# Patient Record
Sex: MALE | Employment: OTHER | URBAN - METROPOLITAN AREA
[De-identification: names, ages, dates, MRNs, and addresses within clinical notes are randomized per-mention and may not be internally consistent; named-entity substitution may affect disease eponyms.]

---

## 2017-05-13 ENCOUNTER — APPOINTMENT (OUTPATIENT)
Dept: GENERAL RADIOLOGY | Age: 72
DRG: 247 | End: 2017-05-13
Attending: EMERGENCY MEDICINE
Payer: MEDICARE

## 2017-05-13 ENCOUNTER — HOSPITAL ENCOUNTER (INPATIENT)
Age: 72
LOS: 7 days | Discharge: HOME | DRG: 247 | End: 2017-05-20
Attending: EMERGENCY MEDICINE | Admitting: HOSPITALIST
Payer: MEDICARE

## 2017-05-13 DIAGNOSIS — I21.4 NSTEMI (NON-ST ELEVATED MYOCARDIAL INFARCTION) (HCC): Primary | ICD-10-CM

## 2017-05-13 LAB
ALBUMIN SERPL BCP-MCNC: 3.8 G/DL (ref 3.4–5)
ALBUMIN/GLOB SERPL: 0.8 {RATIO} (ref 0.8–1.7)
ALP SERPL-CCNC: 91 U/L (ref 45–117)
ALT SERPL-CCNC: 30 U/L (ref 16–61)
ANION GAP BLD CALC-SCNC: 14 MMOL/L (ref 3–18)
APTT PPP: 25.7 SEC (ref 23–36.4)
AST SERPL W P-5'-P-CCNC: 21 U/L (ref 15–37)
BASOPHILS # BLD AUTO: 0 K/UL (ref 0–0.06)
BASOPHILS # BLD: 0 % (ref 0–2)
BILIRUB SERPL-MCNC: 0.1 MG/DL (ref 0.2–1)
BUN SERPL-MCNC: 112 MG/DL (ref 7–18)
BUN/CREAT SERPL: 32 (ref 12–20)
CALCIUM SERPL-MCNC: 9.1 MG/DL (ref 8.5–10.1)
CHLORIDE SERPL-SCNC: 99 MMOL/L (ref 100–108)
CK MB CFR SERPL CALC: 2.1 % (ref 0–4)
CK MB SERPL-MCNC: 3.9 NG/ML (ref 5–25)
CK SERPL-CCNC: 190 U/L (ref 39–308)
CO2 SERPL-SCNC: 26 MMOL/L (ref 21–32)
CREAT SERPL-MCNC: 3.48 MG/DL (ref 0.6–1.3)
D DIMER PPP FEU-MCNC: 1.36 UG/ML(FEU)
DIFFERENTIAL METHOD BLD: ABNORMAL
EOSINOPHIL # BLD: 0.1 K/UL (ref 0–0.4)
EOSINOPHIL NFR BLD: 1 % (ref 0–5)
ERYTHROCYTE [DISTWIDTH] IN BLOOD BY AUTOMATED COUNT: 16 % (ref 11.6–14.5)
GLOBULIN SER CALC-MCNC: 4.5 G/DL (ref 2–4)
GLUCOSE SERPL-MCNC: 254 MG/DL (ref 74–99)
HCT VFR BLD AUTO: 32.5 % (ref 36–48)
HGB BLD-MCNC: 10.4 G/DL (ref 13–16)
LIPASE SERPL-CCNC: 587 U/L (ref 73–393)
LYMPHOCYTES # BLD AUTO: 9 % (ref 21–52)
LYMPHOCYTES # BLD: 1.3 K/UL (ref 0.9–3.6)
MCH RBC QN AUTO: 25.9 PG (ref 24–34)
MCHC RBC AUTO-ENTMCNC: 32 G/DL (ref 31–37)
MCV RBC AUTO: 80.8 FL (ref 74–97)
MONOCYTES # BLD: 0.6 K/UL (ref 0.05–1.2)
MONOCYTES NFR BLD AUTO: 4 % (ref 3–10)
NEUTS SEG # BLD: 12.5 K/UL (ref 1.8–8)
NEUTS SEG NFR BLD AUTO: 86 % (ref 40–73)
PLATELET # BLD AUTO: 248 K/UL (ref 135–420)
PMV BLD AUTO: 11 FL (ref 9.2–11.8)
POTASSIUM SERPL-SCNC: 3.3 MMOL/L (ref 3.5–5.5)
PROT SERPL-MCNC: 8.3 G/DL (ref 6.4–8.2)
RBC # BLD AUTO: 4.02 M/UL (ref 4.7–5.5)
SODIUM SERPL-SCNC: 139 MMOL/L (ref 136–145)
TROPONIN I SERPL-MCNC: 0.09 NG/ML (ref 0–0.06)
WBC # BLD AUTO: 14.6 K/UL (ref 4.6–13.2)

## 2017-05-13 PROCEDURE — 85025 COMPLETE CBC W/AUTO DIFF WBC: CPT | Performed by: EMERGENCY MEDICINE

## 2017-05-13 PROCEDURE — 74011250637 HC RX REV CODE- 250/637: Performed by: EMERGENCY MEDICINE

## 2017-05-13 PROCEDURE — 85379 FIBRIN DEGRADATION QUANT: CPT | Performed by: EMERGENCY MEDICINE

## 2017-05-13 PROCEDURE — 65270000029 HC RM PRIVATE

## 2017-05-13 PROCEDURE — 99285 EMERGENCY DEPT VISIT HI MDM: CPT

## 2017-05-13 PROCEDURE — 71010 XR CHEST PORT: CPT

## 2017-05-13 PROCEDURE — 82550 ASSAY OF CK (CPK): CPT | Performed by: EMERGENCY MEDICINE

## 2017-05-13 PROCEDURE — 85730 THROMBOPLASTIN TIME PARTIAL: CPT | Performed by: EMERGENCY MEDICINE

## 2017-05-13 PROCEDURE — 96374 THER/PROPH/DIAG INJ IV PUSH: CPT

## 2017-05-13 PROCEDURE — 94762 N-INVAS EAR/PLS OXIMTRY CONT: CPT

## 2017-05-13 PROCEDURE — 74011250636 HC RX REV CODE- 250/636: Performed by: EMERGENCY MEDICINE

## 2017-05-13 PROCEDURE — 80053 COMPREHEN METABOLIC PANEL: CPT | Performed by: EMERGENCY MEDICINE

## 2017-05-13 PROCEDURE — 83690 ASSAY OF LIPASE: CPT | Performed by: EMERGENCY MEDICINE

## 2017-05-13 PROCEDURE — 93005 ELECTROCARDIOGRAM TRACING: CPT

## 2017-05-13 RX ORDER — BUMETANIDE 1 MG/1
1 TABLET ORAL 2 TIMES DAILY
COMMUNITY

## 2017-05-13 RX ORDER — HEPARIN SODIUM 10000 [USP'U]/100ML
8.16-25 INJECTION, SOLUTION INTRAVENOUS
Status: DISCONTINUED | OUTPATIENT
Start: 2017-05-13 | End: 2017-05-18

## 2017-05-13 RX ORDER — AMLODIPINE BESYLATE 5 MG/1
5 TABLET ORAL DAILY
Status: ON HOLD | COMMUNITY
End: 2017-05-20

## 2017-05-13 RX ORDER — DUTASTERIDE 0.5 MG/1
0.5 CAPSULE, LIQUID FILLED ORAL DAILY
COMMUNITY

## 2017-05-13 RX ORDER — HEPARIN SODIUM 1000 [USP'U]/ML
32.7 INJECTION, SOLUTION INTRAVENOUS; SUBCUTANEOUS ONCE
Status: COMPLETED | OUTPATIENT
Start: 2017-05-13 | End: 2017-05-13

## 2017-05-13 RX ORDER — PREDNISONE 5 MG/1
5 TABLET ORAL DAILY
COMMUNITY

## 2017-05-13 RX ORDER — GUAIFENESIN 100 MG/5ML
81 LIQUID (ML) ORAL DAILY
COMMUNITY

## 2017-05-13 RX ORDER — POTASSIUM CHLORIDE 750 MG/1
10 TABLET, FILM COATED, EXTENDED RELEASE ORAL 2 TIMES DAILY
COMMUNITY
End: 2017-05-20

## 2017-05-13 RX ORDER — ISOSORBIDE MONONITRATE 30 MG/1
30 TABLET, EXTENDED RELEASE ORAL DAILY
COMMUNITY

## 2017-05-13 RX ORDER — GLIMEPIRIDE 2 MG/1
2 TABLET ORAL
COMMUNITY

## 2017-05-13 RX ORDER — CHOLECALCIFEROL TAB 125 MCG (5000 UNIT) 125 MCG
5000 TAB ORAL DAILY
COMMUNITY

## 2017-05-13 RX ORDER — HYDRALAZINE HYDROCHLORIDE 50 MG/1
50 TABLET, FILM COATED ORAL 2 TIMES DAILY
COMMUNITY

## 2017-05-13 RX ORDER — LOSARTAN POTASSIUM 50 MG/1
50 TABLET ORAL DAILY
COMMUNITY

## 2017-05-13 RX ORDER — LEVOTHYROXINE SODIUM 100 UG/1
100 TABLET ORAL
COMMUNITY

## 2017-05-13 RX ORDER — ATORVASTATIN CALCIUM 40 MG/1
40 TABLET, FILM COATED ORAL DAILY
COMMUNITY
End: 2017-05-20

## 2017-05-13 RX ORDER — GUAIFENESIN 100 MG/5ML
243 LIQUID (ML) ORAL
Status: COMPLETED | OUTPATIENT
Start: 2017-05-13 | End: 2017-05-13

## 2017-05-13 RX ADMIN — NITROGLYCERIN 0.5 INCH: 20 OINTMENT TOPICAL at 20:32

## 2017-05-13 RX ADMIN — ASPIRIN 81 MG 243 MG: 81 TABLET ORAL at 19:44

## 2017-05-13 RX ADMIN — HEPARIN SODIUM AND DEXTROSE 8.16 UNITS/KG/HR: 10000; 5 INJECTION INTRAVENOUS at 21:39

## 2017-05-13 RX ADMIN — HEPARIN SODIUM 4000 UNITS: 1000 INJECTION, SOLUTION INTRAVENOUS; SUBCUTANEOUS at 21:38

## 2017-05-13 NOTE — ED PROVIDER NOTES
HPI Comments: 7:30 PM Keyla Benavides is a 66 y.o. male who presents to the ED c/o chest tightness that started ~90 minutes ago. The pt reports that he was at a birthday party when he became nauseated. The nausea resolved and he thought it was due to what he was eating at the party. About 90 minutes ago, he was walking into [facility] when he became nauseated again and had one episode of vomiting. He went to sit in his truck and he developed chest tightness, so his wife brought him to the ED. He has a hx of several stents placed in 2008. He took an 81 mg ASA this morning. He does states that they recently drove here from [facility]. He is currently asymptomatic. He denies leg pain, leg swelling, abdominal pain, diarrhea, fever, cough, and any further complaints. The history is provided by the patient. Past Medical History:   Diagnosis Date    Glaucoma     Heart disease     Hypertension     MI (myocardial infarction) ([facility] [address]     Pituitary tumor        Past Surgical History:   Procedure Laterality Date    HX CORONARY STENT PLACEMENT           History reviewed. No pertinent family history. Social History     Social History    Marital status:      Spouse name: N/A    Number of children: N/A    Years of education: N/A     Occupational History    Not on file. Social History Main Topics    Smoking status: Former Smoker    Smokeless tobacco: Not on file    Alcohol use No    Drug use: Not on file    Sexual activity: Not on file     Other Topics Concern    Not on file     Social History Narrative    No narrative on file         ALLERGIES: Review of patient's allergies indicates no known allergies. Review of Systems   Constitutional: Negative. Negative for fever. HENT: Negative. Eyes: Negative. Respiratory: Positive for chest tightness. Negative for cough and shortness of breath. Cardiovascular: Positive for chest pain. Negative for leg swelling.    Gastrointestinal: Positive for nausea and vomiting. Negative for abdominal pain and diarrhea. Endocrine: Negative. Genitourinary: Negative. Musculoskeletal: Negative. Skin: Negative. Allergic/Immunologic: Negative. Neurological: Negative. Hematological: Negative. Psychiatric/Behavioral: Negative. All other systems reviewed and are negative. Vitals:    05/13/17 1930 05/13/17 2007 05/13/17 2010   BP: 182/77 172/90 183/75   Pulse: (!) 110 (!) 104 (!) 104   Resp: 22 16 15   Temp: 98 °F (36.7 °C)     SpO2: 94% 96% 96%   Weight: 121 kg (266 lb 11.2 oz)     Height: 5' 7\" (1.702 m)              Physical Exam   Constitutional: He is oriented to person, place, and time. He appears well-developed and well-nourished. No distress. HENT:   Head: Normocephalic. Mouth/Throat: Oropharynx is clear and moist.   Eyes: Conjunctivae and EOM are normal. Pupils are equal, round, and reactive to light. Neck: Normal range of motion. Neck supple. Cardiovascular: Normal rate, regular rhythm, normal heart sounds and intact distal pulses. No murmur heard. Pulmonary/Chest: Effort normal and breath sounds normal. No respiratory distress. He has no wheezes. He has no rales. He exhibits no tenderness. Abdominal: Soft. Bowel sounds are normal. He exhibits no distension. There is no tenderness. There is no rebound. Genitourinary: Rectal exam shows guaiac positive stool. Genitourinary Comments: Brown stool   Musculoskeletal: Normal range of motion. He exhibits no edema or tenderness. Neurological: He is alert and oriented to person, place, and time. No cranial nerve deficit. He exhibits normal muscle tone. Coordination normal.   Skin: Skin is warm and dry. No rash noted. Psychiatric: He has a normal mood and affect. His behavior is normal. Judgment and thought content normal.   Nursing note and vitals reviewed.        MDM  Number of Diagnoses or Management Options     Amount and/or Complexity of Data Reviewed  Clinical lab tests: ordered and reviewed  Tests in the radiology section of CPT®: ordered and reviewed    Risk of Complications, Morbidity, and/or Mortality  Presenting problems: high  Diagnostic procedures: high  Management options: high    Patient Progress  Patient progress: stable    ED Course       Procedures    Vitals:  Patient Vitals for the past 12 hrs:   Temp Pulse Resp BP SpO2   05/13/17 2010 - (!) 104 15 183/75 96 %   05/13/17 2007 - (!) 104 16 172/90 96 %   05/13/17 1930 98 °F (36.7 °C) (!) 110 22 182/77 94 %     Pulse ox reviewed    Medications ordered:   Medications   nitroglycerin (NITROBID) 2 % ointment 0.5 Inch (0.5 Inches Topical Given 5/13/17 2032)   heparin (porcine) 1,000 unit/mL injection 4,010 Units (not administered)   heparin 25,000 units in D5W 250 ml infusion (not administered)   aspirin chewable tablet 243 mg (243 mg Oral Given 5/13/17 1944)         Lab findings:  Recent Results (from the past 12 hour(s))   EKG, 12 LEAD, INITIAL    Collection Time: 05/13/17  7:19 PM   Result Value Ref Range    Ventricular Rate 110 BPM    Atrial Rate 110 BPM    P-R Interval 182 ms    QRS Duration 104 ms    Q-T Interval 346 ms    QTC Calculation (Bezet) 468 ms    Calculated P Axis 100 degrees    Calculated R Axis 18 degrees    Calculated T Axis 114 degrees    Diagnosis       Sinus tachycardia  Cannot rule out Inferior infarct , age undetermined  T wave abnormality, consider lateral ischemia  Abnormal ECG  No previous ECGs available     CARDIAC PANEL,(CK, CKMB & TROPONIN)    Collection Time: 05/13/17  7:35 PM   Result Value Ref Range     39 - 308 U/L    CK - MB 3.9 (H) <3.6 ng/ml    CK-MB Index 2.1 0.0 - 4.0 %    Troponin-I, Qt. 0.09 (H) 0.00 - 8.00 NG/ML   METABOLIC PANEL, COMPREHENSIVE    Collection Time: 05/13/17  7:35 PM   Result Value Ref Range    Sodium 139 136 - 145 mmol/L    Potassium 3.3 (L) 3.5 - 5.5 mmol/L    Chloride 99 (L) 100 - 108 mmol/L    CO2 26 21 - 32 mmol/L    Anion gap 14 3.0 - 18 mmol/L Glucose 254 (H) 74 - 99 mg/dL     (H) 7.0 - 18 MG/DL    Creatinine 3.48 (H) 0.6 - 1.3 MG/DL    BUN/Creatinine ratio 32 (H) 12 - 20      GFR est AA 21 (L) >60 ml/min/1.73m2    GFR est non-AA 17 (L) >60 ml/min/1.73m2    Calcium 9.1 8.5 - 10.1 MG/DL    Bilirubin, total 0.1 (L) 0.2 - 1.0 MG/DL    ALT (SGPT) 30 16 - 61 U/L    AST (SGOT) 21 15 - 37 U/L    Alk. phosphatase 91 45 - 117 U/L    Protein, total 8.3 (H) 6.4 - 8.2 g/dL    Albumin 3.8 3.4 - 5.0 g/dL    Globulin 4.5 (H) 2.0 - 4.0 g/dL    A-G Ratio 0.8 0.8 - 1.7     LIPASE    Collection Time: 05/13/17  7:35 PM   Result Value Ref Range    Lipase 587 (H) 73 - 393 U/L   CBC WITH AUTOMATED DIFF    Collection Time: 05/13/17  7:35 PM   Result Value Ref Range    WBC 14.6 (H) 4.6 - 13.2 K/uL    RBC 4.02 (L) 4.70 - 5.50 M/uL    HGB 10.4 (L) 13.0 - 16.0 g/dL    HCT 32.5 (L) 36.0 - 48.0 %    MCV 80.8 74.0 - 97.0 FL    MCH 25.9 24.0 - 34.0 PG    MCHC 32.0 31.0 - 37.0 g/dL    RDW 16.0 (H) 11.6 - 14.5 %    PLATELET 834 950 - 247 K/uL    MPV 11.0 9.2 - 11.8 FL    NEUTROPHILS 86 (H) 40 - 73 %    LYMPHOCYTES 9 (L) 21 - 52 %    MONOCYTES 4 3 - 10 %    EOSINOPHILS 1 0 - 5 %    BASOPHILS 0 0 - 2 %    ABS. NEUTROPHILS 12.5 (H) 1.8 - 8.0 K/UL    ABS. LYMPHOCYTES 1.3 0.9 - 3.6 K/UL    ABS. MONOCYTES 0.6 0.05 - 1.2 K/UL    ABS. EOSINOPHILS 0.1 0.0 - 0.4 K/UL    ABS. BASOPHILS 0.0 0.0 - 0.06 K/UL    DF AUTOMATED     D DIMER    Collection Time: 05/13/17  7:35 PM   Result Value Ref Range    D DIMER 1.36 (H) <0.46 ug/ml(FEU)       EKG interpretation by ED Physician:  3597- Sinus tachycardia, rate of 110 BPM, lateral ischemia, no STEMI per Dr. Loly Blevins, CT or other radiology findings or impressions:  XR CHEST PORT   Final Result   Impression:  1. No acute infiltrate or effusion.     2. Cardiomegaly    Per Dr. Omar Glaser, Radiology       Progress notes, Consult notes or additional Procedure notes:   8:38 PM I have reassessed the patient and discussed results and diagnosis.  Pt will be admitted. Patient understands and verbalizes agreement with plan. Consult:  Discussed care with Frank Goodson, Cardiology PA  Standard discussion; including history of patients chief complaint, available diagnostic results, and treatment course. He agrees on consult to Dr. Nestor Chu at [facility]. He recommends starting Heparin. 8:59 PM, 05/13/17     Consult:  Discussed care with Dr. Navi Almonte, Specialty: Hospitalist  Standard discussion; including history of patients chief complaint, available diagnostic results, and treatment course. He agrees on admission to [facility]. 9:08 PM, 05/13/17     9:26 PM Patient reassessed, performed rectal exam prior to starting Heparin. Patient had heme positive brown stool. Will discuss with hospitalist.     9:32 PM Discussed with Dr. Navi Almonte, he states to continue Heparin. Disposition:  Diagnosis: acute elevated troponin, elevated D-dimer, R/O acute coronary syndrome, non STEMI, R/O DVT    Disposition:admission      SCRIBE ATTESTATION STATEMENT  Documented by: Gennaro Wadsworth scribing for, and in the presence of, Ada Mi MD 7:41 PM     Signed by: Ping Hernandez, 05/13/17 7:41 PM    PROVIDER ATTESTATION STATEMENT  I personally performed the services described in the documentation, reviewed the documentation, as recorded by the scribe in my presence, and it accurately and completely records my words and actions.   Ada Mi MD

## 2017-05-13 NOTE — IP AVS SNAPSHOT
[address] [place] [id number] 
[phone] Patient: Lashonda Alvarez. MRN: [id number] :1945 You are allergic to the following No active allergies Recent Documentation Height Weight BMI Smoking Status 1.702 m 129.6 kg 44.75 kg/m2 Former Smoker Emergency Contacts Name Discharge Info Relation Home Work Mobile Lamar Gibbs  Spouse [3] [phone] About your hospitalization You were admitted on:  May 13, 2017 You last received care in the:  [facility] 2 CV INTNS[facility] CARE You were discharged on:  May 20, 2017 Unit phone number:  [phone] Why you were hospitalized Your primary diagnosis was:  Heme Positive Stool Your diagnoses also included:  Nstemi (Non-St Elevated Myocardial Infarction) (Hcc), Chronic Kidney Disease, Stage Iii (Moderate), Anemia, Hypertension Providers Seen During Your Hospitalizations Provider Role Specialty Primary office phone Yolanda Menendez MD Attending Provider Emergency Medicine [phone] Logan Nye MD Attending Provider Internal Medicine [phone] Your Primary Care Physician (PCP) Primary Care Physician Office Phone Office Fax UNKNOWN, PROVIDER ** None ** ** None ** Follow-up Information Follow up With Details Comments Contact Info Dr. Rohan Mathews  Patient states he has a follow-up appointment with PCP on 17 Internal Medicine [address] [facility] [address] [place] [address] Phone: [phone] Dr. Toby Jeter   Patient states he has a follow-up appointment with cardiologist on 17 Cardiologist: 
[address] [place] [address] # [address], [place], [address] [facility] Phone: [phone] Dr. Keerthi Artis  Please make appointment asap with kidney doctor Nephrology: 
[id number], [place], [address] [facility] Phone: [phone] Current Discharge Medication List  
 START taking these medications Dose & Instructions Dispensing Information Comments Morning Noon Evening Bedtime  
 clopidogrel 75 mg Tab Commonly known as:  PLAVIX Your last dose was: Your next dose is:    
   
   
 Dose:  75 mg Take 1 Tab by mouth daily. Quantity:  30 Tab Refills:  0  
     
   
   
   
  
 metoprolol tartrate 25 mg tablet Commonly known as:  LOPRESSOR Your last dose was: Your next dose is:    
   
   
 Dose:  12.5 mg Take 0.5 Tabs by mouth two (2) times a day. Quantity:  60 Tab Refills:  0  
     
   
   
   
  
 rosuvastatin 40 mg tablet Commonly known as:  CRESTOR Your last dose was: Your next dose is:    
   
   
 Dose:  40 mg Take 1 Tab by mouth nightly. Quantity:  30 Tab Refills:  0 CONTINUE these medications which have CHANGED Dose & Instructions Dispensing Information Comments Morning Noon Evening Bedtime  
 amLODIPine 5 mg tablet Commonly known as:  Kristen Fast What changed:  how much to take Your last dose was: Your next dose is:    
   
   
 Dose:  10 mg Take 2 Tabs by mouth daily. Quantity:  20 Tab Refills:  0 Increased dose CONTINUE these medications which have NOT CHANGED Dose & Instructions Dispensing Information Comments Morning Noon Evening Bedtime ALLOPURINOL PO Your last dose was: Your next dose is:    
   
   
 Dose:  150 mg Take 150 mg by mouth daily. Refills:  0  
     
   
   
   
  
 aspirin 81 mg chewable tablet Your last dose was: Your next dose is:    
   
   
 Dose:  81 mg Take 81 mg by mouth daily. Refills:  0  
     
   
   
   
  
 AVODART 0.5 mg capsule Generic drug:  dutasteride Your last dose was: Your next dose is:    
   
   
 Dose:  0.5 mg Take 0.5 mg by mouth daily. Refills:  0  
     
   
   
   
  
 bumetanide 1 mg tablet Commonly known as:  Leyla Alba Your last dose was: Your next dose is:    
   
   
 Dose:  1 mg Take 1 mg by mouth two (2) times a day. Refills:  0  
     
   
   
   
  
 cholecalciferol (VITAMIN D3) 5,000 unit Tab tablet Commonly known as:  VITAMIN D3 Your last dose was: Your next dose is:    
   
   
 Dose:  5000 Units Take 5,000 Units by mouth daily. Refills:  0  
     
   
   
   
  
 * FOLBIC PO Your last dose was: Your next dose is: Take  by mouth. Refills:  0  
     
   
   
   
  
 * VIRT-NABIL 2.5-25-1 mg tablet Generic drug:  folic acid-vit E2-ZAF U35 Your last dose was: Your next dose is:    
   
   
 Dose:  1 Tab Take 1 Tab by mouth daily. Refills:  0  
     
   
   
   
  
 glimepiride 2 mg tablet Commonly known as:  AMARYL Your last dose was: Your next dose is:    
   
   
 Dose:  2 mg Take 2 mg by mouth every morning. Refills:  0  
     
   
   
   
  
 hydrALAZINE 50 mg tablet Commonly known as:  APRESOLINE Your last dose was: Your next dose is:    
   
   
 Dose:  50 mg Take 50 mg by mouth two (2) times a day. Refills:  0 IMDUR 30 mg tablet Generic drug:  isosorbide mononitrate ER Your last dose was: Your next dose is:    
   
   
 Dose:  30 mg Take 30 mg by mouth daily. Refills:  0 KEPPRA PO Your last dose was: Your next dose is:    
   
   
 Dose:  1500 mg Take 1,500 mg by mouth two (2) times a day. Refills:  0  
     
   
   
   
  
 levothyroxine 100 mcg tablet Commonly known as:  SYNTHROID Your last dose was: Your next dose is:    
   
   
 Dose:  100 mcg Take 100 mcg by mouth Daily (before breakfast). Refills:  0  
     
   
   
   
  
 losartan 50 mg tablet Commonly known as:  COZAAR Your last dose was: Your next dose is: Dose:  50 mg Take 50 mg by mouth daily. Refills:  0  
     
   
   
   
  
 predniSONE 5 mg tablet Commonly known as:  Valentino Farrar Your last dose was: Your next dose is:    
   
   
 Dose:  5 mg Take 5 mg by mouth daily. 2.5 mg in am  
 Refills:  0  
     
   
   
   
  
 TRAVATAN OP Your last dose was: Your next dose is:    
   
   
 Apply  to eye. Refills:  0  
     
   
   
   
  
 * Notice: This list has 2 medication(s) that are the same as other medications prescribed for you. Read the directions carefully, and ask your doctor or other care provider to review them with you. STOP taking these medications KLOR-CON 10 10 mEq tablet Generic drug:  potassium chloride SR  
   
  
 LIPITOR 40 mg tablet Generic drug:  atorvastatin Where to Get Your Medications Information on where to get these meds will be given to you by the nurse or doctor. ! Ask your nurse or doctor about these medications  
  amLODIPine 5 mg tablet  
 clopidogrel 75 mg Tab  
 metoprolol tartrate 25 mg tablet  
 rosuvastatin 40 mg tablet Discharge Instructions DISCHARGE SUMMARY from Nurse The following personal items are in your possession at time of discharge: 
 
Dental Appliances: None Visual Aid: None Home Medications: Sent home Jewelry: Ring Clothing: At bedside Other Valuables: None PATIENT INSTRUCTIONS: 
 
After general anesthesia or intravenous sedation, for 24 hours or while taking prescription Narcotics: · Limit your activities · Do not drive and operate hazardous machinery · Do not make important personal or business decisions · Do  not drink alcoholic beverages · If you have not urinated within 8 hours after discharge, please contact your surgeon on call. Report the following to your cardiologist 
· Excessive pain, swelling, redness or odor of or around the surgical area · Temperature over 101 · Nausea and vomiting lasting longer than 4 hours or if unable to take medications · Any signs of decreased circulation or nerve impairment to extremity: change in color, persistent  numbness, tingling, coldness or increase pain · Any questions Cardiac Catheterization/Angiography Discharge Instructions *Check the puncture site frequently for swelling or bleeding. If you see any bleeding, lie down and apply pressure over the area with a clean town or washcloth. Notify your doctor for any redness, swelling, drainage or oozing from the puncture site. Notify your doctor for any fever or chills. *If the leg or arm with the puncture becomes cold, numb or painful, call Cardiologist  
 
*Activity should be limited for the next 48 hours. Climb stairs as little as possible and avoid any stooping, bending or strenuous activity for 48 hours. No heavy lifting (anything over 10 pounds) for three days. *Do not drive for 48 hours. *You may resume your usual diet. Drink more fluids than usual. 
 
*Have a responsible person drive you home and stay with you for at least 24 hours after your heart catheterization/angiography. *You may remove the bandage from your Right and Arm in 24 hours. You may shower in 24 hours. No tub baths, hot tubs or swimming for one week. Do not place any lotions, creams, powders, ointments over the puncture site for one week. You may place a clean band-aid over the puncture site each day for 5 days. Change this daily. What to do at Home: 
Recommended activity: Activity as tolerated and no driving for today and No heavy lifting, pushing, until follow up with cardiologist 
 
If you experience any of the following symptoms fever chills numbness tingling to affected site , please follow up with cardiologist. 
 
 
*  Please give a list of your current medications to your Primary Care Provider.  
 
*  Please update this list whenever your medications are discontinued, doses are 
 changed, or new medications (including over-the-counter products) are added. *  Please carry medication information at all times in case of emergency situations. These are general instructions for a healthy lifestyle: No smoking/ No tobacco products/ Avoid exposure to second hand smoke Surgeon General's Warning:  Quitting smoking now greatly reduces serious risk to your health. Obesity, smoking, and sedentary lifestyle greatly increases your risk for illness A healthy diet, regular physical exercise & weight monitoring are important for maintaining a healthy lifestyle You may be retaining fluid if you have a history of heart failure or if you experience any of the following symptoms:  Weight gain of 3 pounds or more overnight or 5 pounds in a week, increased swelling in our hands or feet or shortness of breath while lying flat in bed. Please call your doctor as soon as you notice any of these symptoms; do not wait until your next office visit. Recognize signs and symptoms of STROKE: 
 
F-face looks uneven A-arms unable to move or move unevenly S-speech slurred or non-existent T-time-call 911 as soon as signs and symptoms begin-DO NOT go Back to bed or wait to see if you get better-TIME IS BRAIN. Warning Signs of HEART ATTACK Call 911 if you have these symptoms: 
? Chest discomfort. Most heart attacks involve discomfort in the center of the chest that lasts more than a few minutes, or that goes away and comes back. It can feel like uncomfortable pressure, squeezing, fullness, or pain. ? Discomfort in other areas of the upper body. Symptoms can include pain or discomfort in one or both arms, the back, neck, jaw, or stomach. ? Shortness of breath with or without chest discomfort. ? Other signs may include breaking out in a cold sweat, nausea, or lightheadedness. Don't wait more than five minutes to call [address]!  Fast action can save your life. Calling 911 is almost always the fastest way to get lifesaving treatment. Emergency Medical Services staff can begin treatment when they arrive  up to an hour sooner than if someone gets to the hospital by car. The discharge information has been reviewed with the patient and spouse. The patient and spouse verbalized understanding. Discharge medications reviewed with the patient and spouse and appropriate educational materials and side effects teaching were provided. Patient armband removed and shredded Discharge Instructions Attachments/References CLOPIDOGREL (BY MOUTH) (ENGLISH) AMLODIPINE (BY MOUTH) (ENGLISH) METOPROLOL (BY MOUTH) (ENGLISH) ROSUVASTATIN (BY MOUTH) (ENGLISH) DIABETES: HEART DISEASE: GENERAL INFO (ENGLISH) KIDNEY DISEASE: DIABETES (ENGLISH) HEART ATTACK: PATIENT AFTERCARE INFORMATION 
PCI (PERCUTANEOUS CORONARY INTERVENTION): POST-OP (ENGLISH) Discharge Orders None Selvz Announcement We are excited to announce that we are making your provider's discharge notes available to you in Selvz. You will see these notes when they are completed and signed by the physician that discharged you from your recent hospital stay. If you have any questions or concerns about any information you see in Selvz, please call the Health Information Department where you were seen or reach out to your Primary Care Provider for more information about your plan of care. Introducing [facility]! Ginger Pereira introduces Selvz patient portal. Now you can access parts of your medical record, email your doctor's office, and request medication refills online. 1. In your internet browser, go to [email or web address] 2. Click on the First Time User? Click Here link in the Sign In box. You will see the New Member Sign Up page. 3. Enter your Selvz Access Code exactly as it appears below.  You will not need to use this code after youve completed the sign-up process. If you do not sign up before the expiration date, you must request a new code. · Wanderu Access Code: [email or web address] Expires: 8/18/2017 12:03 PM 
 
4. Enter the last four digits of your Social Security Number (xxxx) and Date of Birth (mm/dd/yyyy) as indicated and click Submit. You will be taken to the next sign-up page. 5. Create a Wanderu ID. This will be your Wanderu login ID and cannot be changed, so think of one that is secure and easy to remember. 6. Create a Wanderu password. You can change your password at any time. 7. Enter your Password Reset Question and Answer. This can be used at a later time if you forget your password. 8. Enter your e-mail address. You will receive e-mail notification when new information is available in [address] 9. Click Sign Up. You can now view and download portions of your medical record. 10. Click the Download Summary menu link to download a portable copy of your medical information. If you have questions, please visit the Frequently Asked Questions section of the Wanderu website. Remember, Wanderu is NOT to be used for urgent needs. For medical emergencies, dial 911. Now available from your iPhone and Android! General Information Please provide this summary of care documentation to your next provider. Patient Signature:  ____________________________________________________________ Date:  ____________________________________________________________  
  
[facility] Provider Signature:  ____________________________________________________________ Date:  ____________________________________________________________ More Information Clopidogrel (By mouth) Clopidogrel (fvgn-HKA-ge-grel) Helps prevent stroke, heart attack, and other heart problems. This medicine is a platelet inhibitor. Brand Name(s): Plavix There may be other brand names for this medicine. When This Medicine Should Not Be Used: This medicine is not right for everyone. Do not use it if you had an allergic reaction to clopidogrel. How to Use This Medicine:  
Tablet · Your doctor will tell you how much medicine to use. Do not use more than directed. · This medicine should come with a Medication Guide. Ask your pharmacist for a copy if you do not have one. · Missed dose: Take a dose as soon as you remember. If it is almost time for your next dose, wait until then and take a regular dose. Do not take extra medicine to make up for a missed dose. · Store the medicine in a closed container at room temperature, away from heat, moisture, and direct light. Drugs and Foods to Avoid: Ask your doctor or pharmacist before using any other medicine, including over-the-counter medicines, vitamins, and herbal products. · Some medicines can affect how clopidogrel works. Tell your doctor if you are using any of the following: ¨ Esomeprazole, omeprazole, repaglinide, or warfarin ¨ Medicine to treat depression ¨ NSAID pain or arthritis medicine (including celecoxib, diclofenac, ibuprofen, or naproxen) Warnings While Using This Medicine: · Tell your doctor if you are pregnant or breastfeeding, or if you have bleeding problems, stomach ulcer or bleeding, a recent stroke, or have a history of bleeding problems. · This medicine can cause you to bleed and bruise more easily. Take precautions to avoid injury. Brush and floss your teeth gently, do not play rough sports, and be careful with sharp objects. Severe bleeding can be life-threatening. · This medicine may cause a rare but serious blood clotting condition called thrombotic thrombocytopenic purpura. · Make sure any doctor or dentist who treats you knows that you are using this medicine. Tell your doctor if you plan to have surgery or a dental procedure. · Do not stop using this medicine suddenly. Your doctor will need to slowly decrease your dose before you stop it completely. · Your doctor will check your progress and the effects of this medicine at regular visits. Keep all appointments. · Keep all medicine out of the reach of children. Never share your medicine with anyone. Possible Side Effects While Using This Medicine:  
Call your doctor right away if you notice any of these side effects: · Allergic reaction: Itching or hives, swelling in your face or hands, swelling or tingling in your mouth or throat, chest tightness, trouble breathing · Bloody or black, tarry stools · Nosebleeds · Pinpoint red or purple spots on your skin or in your mouth · Problems with vision, speech, or walking · Red or dark brown urine · Seizures · Severe stomach pain · Trouble breathing, tiredness, fast heartbeat, yellow skin or eyes · Unusual bleeding, bruising, or weakness · Vomiting of blood or vomit that looks like coffee grounds If you notice other side effects that you think are caused by this medicine, tell your doctor. Call your doctor for medical advice about side effects. You may report side effects to FDA at 1-098-FDA-4295 © 2017 [address] Information is for End User's use only and may not be sold, redistributed or otherwise used for commercial purposes. The above information is an  only. It is not intended as medical advice for individual conditions or treatments. Talk to your doctor, nurse or pharmacist before following any medical regimen to see if it is safe and effective for you. Amlodipine (By mouth) Amlodipine (eh-PGW-is-peen) Treats high blood pressure and angina (chest pain). This medicine is a calcium channel blocker. Brand Name(s): Norvasc There may be other brand names for this medicine. When This Medicine Should Not Be Used: This medicine is not right for everyone.  Do not use it if you had an allergic reaction to amlodipine. How to Use This Medicine:  
Tablet, Dissolving Tablet · Take your medicine as directed. Your dose may need to be changed several times to find what works best for you. Take this medicine at the same time each day. · Read and follow the patient instructions that come with this medicine. Talk to your doctor or pharmacist if you have any questions. · Missed dose: Take a dose as soon as you remember. If it has been more than 12 hours since you were supposed to take your dose, skip the missed dose and take your next regular dose at the regular time. · Store the medicine in a closed container at room temperature, away from heat, moisture, and direct light. Drugs and Foods to Avoid: Ask your doctor or pharmacist before using any other medicine, including over-the-counter medicines, vitamins, and herbal products. · Some medicines can affect how amlodipine works. Tell your doctor if you are also using any of the following: ¨ Clarithromycin, cyclosporine, diltiazem, itraconazole, ritonavir, sildenafil, simvastatin, tacrolimus Warnings While Using This Medicine: · Tell your doctor if you are pregnant or breastfeeding, or if you have liver disease, heart disease, coronary artery disease, or aortic stenosis. · This medicine could lower your blood pressure too much, especially when you first use it or if you are dehydrated. Stand or sit up slowly if you feel lightheaded or dizzy. · Your doctor will check your progress and the effects of this medicine at regular visits. Keep all appointments. · Do not stop using this medicine without asking your doctor, even if you feel well. This medicine will not cure high blood pressure, but it will help keep it in normal range. You may have to take blood pressure medicine for the rest of your life. · Keep all medicine out of the reach of children. Never share your medicine with anyone. Possible Side Effects While Using This Medicine: Call your doctor right away if you notice any of these side effects: · Allergic reaction: Itching or hives, swelling in your face or hands, swelling or tingling in your mouth or throat, chest tightness, trouble breathing · Lightheadedness, dizziness · New or worsening chest pain · Swelling in your hands, ankles, or legs · Trouble breathing, nausea, unusual sweating, fainting If you notice other side effects that you think are caused by this medicine, tell your doctor. Call your doctor for medical advice about side effects. You may report side effects to FDA at 8-190-QPX-3635 © 2017 [facility] Information is for End User's use only and may not be sold, redistributed or otherwise used for commercial purposes. The above information is an  only. It is not intended as medical advice for individual conditions or treatments. Talk to your doctor, nurse or pharmacist before following any medical regimen to see if it is safe and effective for you. Metoprolol (By mouth) Metoprolol (met-oh-PROE-lol) Treats high blood pressure, angina (chest pain), and heart failure. May lower the risk of death after a heart attack. This medicine is a beta-blocker. Brand Name(s): Lopressor, Toprol XL There may be other brand names for this medicine. When This Medicine Should Not Be Used: This medicine is not right for everyone. Do not use if you had an allergic reaction to metoprolol or similar medicines. Do not use this medicine if you have certain blood circulation or heart problems. Ask your doctor about these problems. How to Use This Medicine:  
Tablet, Long Acting Tablet · Take your medicine as directed. Your dose may need to be changed several times to find what works best for you. · Take this medicine with a meal or right after a meal. Take this medicine the same way every day, at the same time. · Swallow the tablet whole with a glass of water.  You may break the extended-release tablet in half, but do not chew or crush it. · Missed dose: Take a dose as soon as you remember. If it is almost time for your next dose, wait until then and take a regular dose. Do not take extra medicine to make up for a missed dose. · Store the medicine in a closed container at room temperature, away from heat, moisture, and direct light. Drugs and Foods to Avoid: Ask your doctor or pharmacist before using any other medicine, including over-the-counter medicines, vitamins, and herbal products. · Some medicines can affect how metoprolol works. Tell your doctor if you are taking any of the following: ¨ Digoxin, dipyridamole, hydralazine, hydroxychloroquine, methyldopa, quinidine ¨ Medicine to treat depression (such as bupropion, clomipramine, desipramine, fluoxetine, fluvoxamine, paroxetine, sertraline), medicine to treat mental illness (such as chlorpromazine, fluphenazine, haloperidol, thioridazine), medicine for heart rhythm problems (such as propafenone), HIV/AIDS medicine (such as ritonavir), medicine to treat a fungus infection (such as terbinafine), a monoamine oxidase inhibitor (MAOI), an ergot medicine for headaches, a calcium channel blocker (such as amlodipine, diltiazem, verapamil), or an alpha blocker (such as clonidine, prazosin, reserpine, guanethidine) Warnings While Using This Medicine: · Tell your doctor if you are pregnant or breastfeeding, or if you have blood vessel, heart, or circulation problems (such as heart failure, rhythm problems, or slow heartbeat). Tell your doctor if you have kidney disease, liver disease, diabetes, lung disease (such as asthma), an overactive thyroid, or a history of allergies. · This medicine may cause worse symptoms of heart failure while the dose is being adjusted. · Do not stop using this medicine suddenly. Your doctor will need to slowly decrease your dose before you stop it completely. · Tell any doctor or dentist who treats you that you are using this medicine. You may need to stop using this medicine several days before you have surgery or medical tests. · This medicine could lower your blood pressure too much, especially when you first use it or if you are dehydrated. Stand or sit up slowly if you feel lightheaded or dizzy. · This medicine may make you dizzy or drowsy. Do not drive or do anything else that could be dangerous until you know how this medicine affects you. · Your doctor will check your progress and the effects of this medicine at regular visits. Keep all appointments. · Keep all medicine out of the reach of children. Never share your medicine with anyone. Possible Side Effects While Using This Medicine:  
Call your doctor right away if you notice any of these side effects: · Allergic reaction: Itching or hives, swelling in your face or hands, swelling or tingling in your mouth or throat, chest tightness, trouble breathing · Lightheadedness, dizziness, or fainting · Slow heartbeat · Swelling in your hands, ankles, or feet, trouble breathing, tiredness · Worsening chest pain If you notice these less serious side effects, talk with your doctor: · Diarrhea · Mild dizziness or tiredness If you notice other side effects that you think are caused by this medicine, tell your doctor. Call your doctor for medical advice about side effects. You may report side effects to FDA at 0-151-FDA-2223 © 2017 [facility] Information is for End User's use only and may not be sold, redistributed or otherwise used for commercial purposes. The above information is an  only. It is not intended as medical advice for individual conditions or treatments. Talk to your doctor, nurse or pharmacist before following any medical regimen to see if it is safe and effective for you. Rosuvastatin (By mouth) Rosuvastatin (rmp-[id number]-dm-STAT-in) Treats high cholesterol and triglyceride levels. May reduce the risk of heart attack, stroke, and related health conditions. This medicine is a statin. Brand Name(s): Crestor There may be other brand names for this medicine. When This Medicine Should Not Be Used: This medicine is not right for everyone. Do not use it if you had an allergic reaction to rosuvastatin, you have active liver disease, or you are pregnant or breastfeeding. How to Use This Medicine:  
Tablet · Take your medicine as directed. Your dose may need to be changed several times to find what works best for you. · Swallow the tablet whole. Do not crush, break, or chew it. · Read and follow the patient instructions that come with this medicine. Talk to your doctor or pharmacist if you have any questions. · Missed dose: Take a dose as soon as you remember. If it is almost time for your next dose, wait until then and take a regular dose. Do not take extra medicine to make up for a missed dose. Do not take 2 doses within 12 hours. · Store the medicine in a closed container at room temperature, away from heat, moisture, and direct light. Drugs and Foods to Avoid: Ask your doctor or pharmacist before using any other medicine, including over-the-counter medicines, vitamins, and herbal products. · Some medicines can affect how rosuvastatin works. Tell your doctor if you are taking any of the following: ¨ A blood thinner, such as warfarin ¨ Cimetidine ¨ Cyclosporine ¨ Medicine to treat an infection, such as erythromycin, fluconazole, itraconazole, ketoconazole, atazanavir/ritonavir, or lopinavir/ritonavir ¨ Niacin (Vitamin B3) ¨ Spironolactone · If you need to take an antacid that contains aluminum and magnesium, take the antacid at least 2 hours after you take rosuvastatin. Warnings While Using This Medicine: · It is not safe to take this medicine during pregnancy.  It could harm an unborn baby. Tell your doctor right away if you become pregnant. · Tell your doctor if you have kidney disease, diabetes, an underactive thyroid, a history of liver disease, or muscle pain or weakness. Tell your doctor if you usually have more than 2 drinks of alcohol per day. · Tell any doctor or dentist who treats you that you are using this medicine. You may need to stop using this medicine several days before you have surgery or medical tests. · Your doctor will do lab tests at regular visits to check on the effects of this medicine. Keep all appointments. · Keep all medicine out of the reach of children. Never share your medicine with anyone. Possible Side Effects While Using This Medicine:  
Call your doctor right away if you notice any of these side effects: · Allergic reaction: Itching or hives, swelling in your face or hands, swelling or tingling in your mouth or throat, chest tightness, trouble breathing · Change in how much or how often you urinate, cloudy urine, painful urination · Dark urine or pale stools, nausea, vomiting, loss of appetite, stomach pain, yellow skin or eyes · Muscle pain, tenderness, or weakness · Swelling in your hands, ankles, or feet · Unusual tiredness If you notice other side effects that you think are caused by this medicine, tell your doctor. Call your doctor for medical advice about side effects. You may report side effects to FDA at 3-820-FDA-2168 © 2017 [facility] Information is for End User's use only and may not be sold, redistributed or otherwise used for commercial purposes. The above information is an  only. It is not intended as medical advice for individual conditions or treatments. Talk to your doctor, nurse or pharmacist before following any medical regimen to see if it is safe and effective for you. Learning About Diabetes and Coronary Artery Disease How are diabetes and heart disease connected? Many people think diabetes and heart disease go hand in hand. But having diabetes doesn't have to mean that you are going to have a heart attack someday. Healthy living can help prevent many of the problems that come with both diabetes and heart disease. For some people, diabetes can cause problems in your body that may lead to heart disease. Diabetes can make the problems of heart disease worse. But here's the good news: The good things you're doing to stay healthy with diabeteseating healthy foods, quitting smoking, getting exercise and moreare also helping your heart. How can diabetes lead to heart disease? The same things that make diabetes a serious condition can also lead to heart disease or make it worse. · High cholesterol causes the buildup of a kind of fat inside the blood vessel walls, making them too narrow. This reduces the flow of blood and can cause a heart attack. · High blood pressure pushes blood through the arteries with too much force. Over time, this damages the walls of the arteries. · High blood sugar can damage the lining of blood vessels. This can lead to the hardening and narrowing of the arteries, resulting in less blood flow to the heart. Diabetes also increases your risk for kidney damage. If you have signs of kidney damage, you may also have a higher risk for heart disease. Kidney damage shares many of the risk factors for heart disease (such as high cholesterol, high blood pressure, and high blood sugar). How can you keep your heart healthy when you have diabetes? Managing your diabetes and keeping your heart healthy are two sides of the same coin. Here are some things you can do. · Test your blood sugar levels and get your diabetes tests on schedule. Try to keep your numbers within your target range. · Keep track of your blood pressure. The target for most people with diabetes is below 140/90.  Your doctor will give you a goal that's right for you. If your blood pressure is high, your treatment may also include medicine. Changes in your lifestyle, such as staying at a healthy weight, may also help you lower your blood pressure. · Eat heart-healthy foods. These include fruits, vegetables, whole grains, fish, and low-fat or nonfat dairy foods. Limit sodium, alcohol, and sweets. · If your doctor recommends it, get more exercise. Walking is a good choice. Bit by bit, increase the amount you walk every day. Try for at least 30 minutes on most days of the week. · Do not smoke. Smoking can make diabetes and heart disease worse. If you need help quitting, talk to your doctor about stop-smoking programs and medicines. These can increase your chances of quitting for good. · Your doctor may talk with you about taking medicines for your heart. For example, your doctor may suggest taking a statin or daily aspirin. Where can you learn more? Go to http://ramu-jj.info/. Enter X392 in the search box to learn more about \"Learning About Diabetes and Coronary Artery Disease. \" Current as of: July 28, 2016 Content Version: 11.2 © 7263-1882 [employer]. Care instructions adapted under license by [employer] (which disclaims liability or warranty for this information). If you have questions about a medical condition or this instruction, always ask your healthcare professional. [address] any warranty or liability for your use of this information. Kidney Disease and Diabetes: Care Instructions Your Care Instructions When you have diabetes, your body cannot make enough insulin or use it the way it should. Your body needs insulin to help sugar move from the blood to the cells. Without it, your blood sugar gets too high. High blood sugar damages your kidneys and makes it hard for them to filter blood. This causes fluid and waste to build up in your blood. If you have diabetes, it is very important to keep your blood sugar in your target range. There are many steps you can take to do this. If you can control your blood sugar, you will have the best chance to slow or stop damage to your kidneys. Follow-up care is a key part of your treatment and safety. Be sure to make and go to all appointments, and call your doctor if you are having problems. It's also a good idea to know your test results and keep a list of the medicines you take. How can you care for yourself at home? To control your diabetes and slow or stop damage to your kidneys · Keep your blood sugar in your target range. The American Diabetes Association recommends a hemoglobin A1c (Hb A1c) target level of less than 7%. Talk to your doctor about your target. The lower your A1c, the better your chance of stopping kidney damage. · Keep your blood pressure in your target range. Doctors recommend specific types of blood pressure medicines for people who have diabetes and kidney disease. Examples include ACE inhibitors and angiotensin II receptor blockers (ARBs). Your doctor may have you take one of these even if you don't have high blood pressure. · Take all of your medicines. You may have several. For example, you may take medicines for diabetes, cholesterol, and blood pressure. It's very important to take all of them just as your doctor tells you to and to keep taking them. · Make good food choices. Follow an eating plan that is best for your diabetes and your kidneys. You may want to work with a dietitian to make a plan. A good plan will make sure that you spread carbohydrate throughout the day. It will also make sure that you get the right amount of salt (sodium), fluids, and protein. · Stay at a healthy weight. If you need help to lose weight, talk to your doctor or dietitian. Even small changes can make a difference.  Try to be aware of your portion sizes, eat more fruits and vegetables, and add some activity to your daily routine. · Exercise. Get at least 30 minutes of activity on most days of the week. Walking is a great exercise that most people can do. Being more active can help you control your blood sugar and stay at a healthy weight. It also can help you lower cholesterol and blood pressure. To improve your kidney health · Lower your cholesterol. Keep your LDL less than 100 mg/dL and HDL levels more than 40 mg/dL for men and 50 mg/dL for women. If you have high cholesterol, your doctor may prescribe medicine. He or she may also tell you to eat less saturated fat. · Follow your treatment plan. Check your blood sugar as many times a day as your doctor recommends. Go to all of your follow-up appointments, and be sure to have all the tests your doctor orders. Call your doctor if you think you are having a problem with your medicines. · Take a low-dose aspirin every day if your doctor suggests it. Most doctors believe this can reduce the risk of heart disease and stroke. Your risk of these diseases is much greater than your risk of kidney failure. · Avoid tobacco. Do not smoke or use other tobacco products. If you need help quitting, talk to your doctor about stop-smoking programs and medicines. These can increase your chances of quitting for good. When should you call for help? Call 911 anytime you think you may need emergency care. For example, call if: 
· You passed out (lost consciousness). Call your doctor now or seek immediate medical care if: 
· You have high blood sugar that stays above the desired range after you take steps to lower blood sugar. · Your breath smells fruity. · You are confused or have trouble thinking clearly. · You feel weaker or more tired than usual. 
· You are very thirsty, lightheaded, or dizzy. · You have nausea and vomiting. Watch closely for changes in your health, and be sure to contact your doctor if: 
· You have new or worse swelling of your arms or feet. Where can you learn more? Go to http://ramu-jj.info/. Enter C726 in the search box to learn more about \"Kidney Disease and Diabetes: Care Instructions. \" Current as of: November 20, 2015 Content Version: 11.2 © 1726-5800 [employer]. Care instructions adapted under license by [employer] (which disclaims liability or warranty for this information). If you have questions about a medical condition or this instruction, always ask your healthcare professional. [place] [address] any warranty or liability for your use of this information. What is a heart attack? Your heart muscle needs oxygen to survive. A heart attack occurs when the blood flow that brings oxygen to the heart muscle is severely reduced or cut off completely. This happens because coronary arteries that supply the heart muscle with blood flow can slowly become narrow from a buildup of fat, cholesterol and other substances that together are called plaque. This slow process is known as atherosclerosis. When a piece of plaque in a heart artery breaks, a blood clot forms around the plaque. This blood clot can block the blood flow through the heart muscle. When the heart muscle is starved for oxygen and nutrients, it is called ischemia. When damage or death of part of the heart muscle occurs as a result of ischemia, it is called a heart attack or myocardial infarction (MI). About every 43 seconds, someone in the [place] has a myocardial infarction (heart attack). Why didn't I have any warning? The gradual process of atherosclerosis has no symptoms. One reason there may be no warning signs is that sometimes when a coronary artery becomes narrowed, other nearby vessels that also bring blood to the heart may expand to help compensate.  The network of expanded vessels is called collateral circulation and helps protect some people from heart attacks by getting needed blood to the heart. Collateral circulation can also develop after a heart attack to help the heart muscle recover. A second reason there may be no warning is that a heart artery is only partially blocked. There are no symptoms until a piece of plaque breaks off and a blood clot starts to form on the area of rupture. The clot then completely closes off the artery and then sudden chest pain develops. Controlling blood pressure and taking statin medications can help to prevent this. Is my heart permanently damaged? When a heart attack occurs, the heart muscle that has lost blood supply begins to suffer injury. The amount of damage to the heart muscle depends on the size of the area supplied by the blocked artery and the time between injury and treatment. This is why it is so important to recognize the symptoms of a heart attack and to seek care immediately. Damage to the heart muscle from a prolonged heart attack causes permanent damage by forming scar tissue. Even though a part of the heart muscle may have been severely injured, the rest keeps working. But because your heart has been damaged, it may be weaker and this can affect the amount of blood it is able to pump with each heartbeat. A weak heart can lead to congestive heart failure. However, with proper treatment after a heart attack and lifestyle changes, further damage can be limited or prevented. Cardiac rehabilitation programs can greatly help patients to make these changes in lifestyle and recovery. Will I recover from my heart attack? The answer is most likely yes. The heart muscle begins to heal soon after a heart attack and usually takes about eight weeks. Your doctor may want to restrict your activities during this time to prevent the heart from overworking. He or she may also ask you to make important lifestyle changes to lead a full, productive life that includes taking medications to prevent a future heart attack. Is all chest pain a heart attack? No. One very common type of chest pain is called angina, or angina pectoris. It's a recurring discomfort that usually lasts only a few minutes. Angina occurs when your heart muscle does not get the blood supply and oxygen that it needs. Often angina occurs during exercise or emotional stress when your heart rate and blood pressure increase and your heart muscle requires more oxygen. Other causes of chest pain can be acid reflux, or musculoskeletal pain. It is important, however, that you report chest pain symptoms to your doctor so you can be properly evaluated. Are there other causes of heart attack besides blockage? Demand ischemia (ischemia meaning lack of oxygen) is another type of heart attack. It occurs when your heart does not receive sufficient oxygen from causes other than blockages, such as infection, anemia, and spasm of a coronary artery, congestive heart failure or tachyarrhythmia (abnormally fast heart rate). Blood tests will show the presence of cardiac enzymes (troponin) that indicate this diagnosis, yet there will be no blockages. Cardiac Arrest (not a heart attack) In cardiac arrest, a persons heart stops beating. Cardiac arrest is not the same thing as a heart attack, but cardiac arrest can occur from a heart attack. Cardiac arrest can also occur for other reasons besides a blockage in the artery. These other reasons include low or high potassium levels and other electrolyte abnormalities, congenital abnormalities, illegal drug use, or poor pumping function of the heart as occurs with congestive heart failure. Seconds count in treating both heart attack and cardiac arrest. With cardiac arrest, the odds of survival go down by about 10 percent for every minute until the person is revived. After 10 minutes the risk of permanent brain injury is very high.  
Initial treatment will consist of cardiopulmonary resuscitation (CPR) and defibrillation  (delivery of an electrical shock) to restore the hearts rhythm. For people who are resuscitated and have a heartbeat but do not regain consciousness, hypothermia protocols are sometimes used, this is where the body is cooled for 24 hours then gradually warmed. This has been shown to improve the odds of a good neurological outcome for those patients. Symptoms Some people who have ischemia to the heart don't experience any signs or symptoms. When signs and symptoms occur, the most common is chest pressure or pain, called angina pectoris. Other signs and symptoms which might be experienced more commonly by women, older people and people with diabetes include: 
? Neck or jaw pain ? Shoulder or arm pain ? A fast heartbeat ? Abdominal pain ? Shortness of breath  
? Nausea and vomiting ? Sweating ? Fatigue [address] Staff ([id number]). Risk factors Factors that can increase your risk of developing myocardial ischemia or decreased oxygen to the hearts muscle include: ? Tobacco. Smoking and long-term exposure to secondhand smoke can damage the inside walls of arteries. The damage can allow deposits of cholesterol and other substances to collect and slow blood flow in the coronary arteries. Smoking also increases the risk of blood clots in your coronary arteries. ? Diabetes. Type 1 and type 2 (Insulin Dependent) Diabetes are linked to an increased risk of myocardial ischemia, heart attack and other heart problems. ? Genetics. If you have a parent or a sibling with heart disease this increases your own risk. ? High blood pressure. Over time, high blood pressure can speed up the process of atherosclerosis (plaque buildup), resulting in damage to the hearts arteries. ? High blood cholesterol level. Cholesterol is a major part of the deposits that can narrow your coronary arteries.  A high level of bad (low-density lipoprotein, or LDL) cholesterol in your blood may be due to an inherited condition or a diet high in saturated fats and cholesterol. ? High blood triglyceride level. Triglycerides, another type of blood fat, may also contribute to atherosclerosis. ? Obesity. Obesity is associated with diabetes, high blood pressure and high blood cholesterol levels. ? Waist circumference. A waist measurement of more than 35 inches (89 centimeters) for women and 40 inches (102 cm) in men increases the risk of high blood pressure and heart disease. ? Lack of physical activity. An inactive lifestyle contributes to obesity and is associated with higher cholesterol and triglyceride levels. People who get regular aerobic exercise have better cardiovascular fitness, which is associated with a decreased risk of myocardial ischemia and heart attack. Exercise also lowers high blood pressure. [address] Staff ([id number]). What you can do: 
? Learn to recognize the symptoms of a heart attack. Remember, the more quickly the blood flow is restored to the heart, the less damage will occur to the muscle. ? Follow the medication regimen your doctor prescribes. There are many different types of heart medicines and they work in different ways. If you have a problem affording a medication, inform your doctor. They can often prescribe you a cheaper generic medication ? If you think you are having a heart attack, CALL 911. Do not attempt to drive yourself to the hospital and do not have family or friend drive you either. ? If you are not allergic to aspirin, this medication can help save your life. Only one 325mg tab is necessary. It acts almost immediately if chewed. ? Keep a list of your current medications in your wallet at all times and update it as needed. Medications often used to treat heart attack or myocardial ischemia include; 
? Aspirin.  A daily aspirin or other blood thinner can reduce your risk of blood clots, which might help prevent obstruction of your coronary arteries. Ask your doctor before starting to take aspirin and always include it on the list of your medications. ? Nitrates. These medications temporarily open arteries, improving blood flow to and from your heart. Better blood flow means your heart doesn't have to work as hard. ? Beta blockers. These medications help relax your heart muscle, slow your heartbeat and decrease blood pressure so blood can flow to your heart more easily. ? Calcium channel blockers. These medications relax and widen blood vessels, increasing blood flow in your heart. Calcium channel blockers also slow your pulse and reduce the workload on your heart. ? Cholesterol-lowering medications. These medications decrease the primary material that deposits on the coronary arteries and the statin medications also lower inflammation inside the artery wall. ? Angiotensin-converting enzyme (ACE) inhibitors. These medications help relax blood vessels and lower blood pressure. Your doctor might recommend an ACE inhibitor if you have high blood pressure or diabetes in addition to myocardial ischemia. ? Ranolazine (Ranexa). This medication helps relax your coronary arteries to ease chest pain (angina). Ranolazine may be prescribed with other angina medications, such as calcium channel blockers, beta blockers or nitrates. [address] [place] [address],[facility] 6 Staff. (2015). References 
ResumeScreeners.it- Attacks_[facility]_[email or web address]_Article.jsp#. WFwypcsVCUm heart attack. http://www.COFCO/- 
 an-acute-coronary-syndrome 
FaceUpdate.com.br- attacks#. Josh Fret [address] [place] [address],[facility] 6 Staff. (2015). Diseases and Conditions Myocardial Ischemia. Retrieved From JoselynPurewirek.si- 
ischemia/basics/prevention/con-[id number] Percutaneous Coronary Intervention: What to Expect at [facility] Your Recovery Percutaneous coronary intervention (PCI) is the name for procedures that are used to open a narrowed or blocked coronary artery. The two most common PCI procedures are coronary angioplasty and coronary stent placement. Your groin or arm may have a bruise and feel sore for a day or two after a percutaneous coronary intervention (PCI). You can do light activities around the house, but nothing strenuous for several days. This care sheet gives you a general idea about how long it will take for you to recover. But each person recovers at a different pace. Follow the steps below to get better as quickly as possible. How can you care for yourself at home? Activity · Do not do strenuous exercise and do not lift, pull, or push anything heavy until your doctor says it is okay. This may be for a day or two. You can walk around the house and do light activity, such as cooking. · You may shower 24 to 48 hours after the procedure, if your doctor okays it. Pat the incision dry. Do not take a bath for 1 week, or until your doctor tells you it is okay. · If the catheter was placed in your groin, try not to walk up stairs for the first couple of days. · If the catheter was placed in your arm near your wrist, do not bend your wrist deeply for the first couple of days. Be careful using your hand to get into and out of a chair or bed. · If your doctor recommends it, get more exercise. Walking is a good choice. Bit by bit, increase the amount you walk every day. Try for at least 30 minutes on most days of the week. Diet · Drink plenty of fluids to help your body flush out the dye. If you have kidney, heart, or liver disease and have to limit fluids, talk with your doctor before you increase the amount of fluids you drink.  
· Keep eating a heart-healthy diet that has lots of fruits, vegetables, and whole grains. If you have not been eating this way, talk to your doctor. You also may want to talk to a dietitian. This expert can help you to learn about healthy foods and plan meals. Medicines · Your doctor will tell you if and when you can restart your medicines. He or she will also give you instructions about taking any new medicines. · If you take blood thinners, such as warfarin (Coumadin), clopidogrel (Plavix), or aspirin, be sure to talk to your doctor. He or she will tell you if and when to start taking those medicines again. Make sure that you understand exactly what your doctor wants you to do. · Your doctor will prescribe blood-thinning medicines. You will likely take aspirin plus another antiplatelet, such as clopidogrel (Plavix). It is very important that you take these medicines exactly as directed. These medicines help keep the coronary artery open and reduce your risk of a heart attack. · Call your doctor if you think you are having a problem with your medicine. Care of the catheter site · For 1 or 2 days, keep a bandage over the spot where the catheter was inserted. The bandage probably will fall off in this time. · Put ice or a cold pack on the area for 10 to 20 minutes at a time to help with soreness or swelling. Put a thin cloth between the ice and your skin. Follow-up care is a key part of your treatment and safety. Be sure to make and go to all appointments, and call your doctor if you are having problems. It's also a good idea to know your test results and keep a list of the medicines you take. When should you call for help? Call 911 anytime you think you may need emergency care. For example, call if: 
· You passed out (lost consciousness). · You have severe trouble breathing. · You have sudden chest pain and shortness of breath, or you cough up blood. · You have symptoms of a heart attack, such as: ¨ Chest pain or pressure. ¨ Sweating. ¨ Shortness of breath. ¨ Nausea or vomiting. ¨ Pain that spreads from the chest to the neck, jaw, or one or both shoulders or arms. ¨ Dizziness or lightheadedness. ¨ A fast or uneven pulse. After calling 911, chew 1 adult-strength aspirin. Wait for an ambulance. Do not try to drive yourself. · You have been diagnosed with angina, and you have angina symptoms that do not go away with rest or are not getting better within 5 minutes after you take one dose of nitroglycerin. Call your doctor now or seek immediate medical care if: 
· You are bleeding from the area where the catheter was put in your artery. · You have a fast-growing, painful lump at the catheter site. · You have signs of infection, such as: 
¨ Increased pain, swelling, warmth, or redness. ¨ Red streaks leading from the catheter site. ¨ Pus draining from the catheter site. ¨ A fever. · Your leg or arm looks blue or feels cold, numb, or tingly. Watch closely for changes in your health, and be sure to contact your doctor if you have any problems. Where can you learn more? Go to http://ramu-jj.info/. Enter M209 in the search box to learn more about \"Percutaneous Coronary Intervention: What to Expect at Home. \" Current as of: January 27, 2016 Content Version: 11.2 © 5740-3904 [employer]. Care instructions adapted under license by [employer] (which disclaims liability or warranty for this information). If you have questions about a medical condition or this instruction, always ask your healthcare professional. [place] [address] any warranty or liability for your use of this information.

## 2017-05-13 NOTE — IP AVS SNAPSHOT
Current Discharge Medication List  
  
START taking these medications Dose & Instructions Dispensing Information Comments Morning Noon Evening Bedtime  
 clopidogrel 75 mg Tab Commonly known as:  PLAVIX Your last dose was: Your next dose is:    
   
   
 Dose:  75 mg Take 1 Tab by mouth daily. Quantity:  30 Tab Refills:  0  
     
   
   
   
  
 metoprolol tartrate 25 mg tablet Commonly known as:  LOPRESSOR Your last dose was: Your next dose is:    
   
   
 Dose:  12.5 mg Take 0.5 Tabs by mouth two (2) times a day. Quantity:  60 Tab Refills:  0  
     
   
   
   
  
 rosuvastatin 40 mg tablet Commonly known as:  CRESTOR Your last dose was: Your next dose is:    
   
   
 Dose:  40 mg Take 1 Tab by mouth nightly. Quantity:  30 Tab Refills:  0 CONTINUE these medications which have CHANGED Dose & Instructions Dispensing Information Comments Morning Noon Evening Bedtime  
 amLODIPine 5 mg tablet Commonly known as:  Aggie Matias What changed:  how much to take Your last dose was: Your next dose is:    
   
   
 Dose:  10 mg Take 2 Tabs by mouth daily. Quantity:  20 Tab Refills:  0 Increased dose CONTINUE these medications which have NOT CHANGED Dose & Instructions Dispensing Information Comments Morning Noon Evening Bedtime ALLOPURINOL PO Your last dose was: Your next dose is:    
   
   
 Dose:  150 mg Take 150 mg by mouth daily. Refills:  0  
     
   
   
   
  
 aspirin 81 mg chewable tablet Your last dose was: Your next dose is:    
   
   
 Dose:  81 mg Take 81 mg by mouth daily. Refills:  0  
     
   
   
   
  
 AVODART 0.5 mg capsule Generic drug:  dutasteride Your last dose was: Your next dose is:    
   
   
 Dose:  0.5 mg Take 0.5 mg by mouth daily. Refills:  0 bumetanide 1 mg tablet Commonly known as:  Mattson Flatter Your last dose was: Your next dose is:    
   
   
 Dose:  1 mg Take 1 mg by mouth two (2) times a day. Refills:  0  
     
   
   
   
  
 cholecalciferol (VITAMIN D3) 5,000 unit Tab tablet Commonly known as:  VITAMIN D3 Your last dose was: Your next dose is:    
   
   
 Dose:  5000 Units Take 5,000 Units by mouth daily. Refills:  0  
     
   
   
   
  
 * FOLBIC PO Your last dose was: Your next dose is: Take  by mouth. Refills:  0  
     
   
   
   
  
 * VIRT-NABIL 2.5-25-1 mg tablet Generic drug:  folic acid-vit B9-GYM D10 Your last dose was: Your next dose is:    
   
   
 Dose:  1 Tab Take 1 Tab by mouth daily. Refills:  0  
     
   
   
   
  
 glimepiride 2 mg tablet Commonly known as:  AMARYL Your last dose was: Your next dose is:    
   
   
 Dose:  2 mg Take 2 mg by mouth every morning. Refills:  0  
     
   
   
   
  
 hydrALAZINE 50 mg tablet Commonly known as:  APRESOLINE Your last dose was: Your next dose is:    
   
   
 Dose:  50 mg Take 50 mg by mouth two (2) times a day. Refills:  0 IMDUR 30 mg tablet Generic drug:  isosorbide mononitrate ER Your last dose was: Your next dose is:    
   
   
 Dose:  30 mg Take 30 mg by mouth daily. Refills:  0 KEPPRA PO Your last dose was: Your next dose is:    
   
   
 Dose:  1500 mg Take 1,500 mg by mouth two (2) times a day. Refills:  0  
     
   
   
   
  
 levothyroxine 100 mcg tablet Commonly known as:  SYNTHROID Your last dose was: Your next dose is:    
   
   
 Dose:  100 mcg Take 100 mcg by mouth Daily (before breakfast). Refills:  0  
     
   
   
   
  
 losartan 50 mg tablet Commonly known as:  COZAAR Your last dose was: Your next dose is:    
   
   
 Dose:  50 mg Take 50 mg by mouth daily. Refills:  0  
     
   
   
   
  
 predniSONE 5 mg tablet Commonly known as:  Sarbjit Conklin Your last dose was: Your next dose is:    
   
   
 Dose:  5 mg Take 5 mg by mouth daily. 2.5 mg in am  
 Refills:  0  
     
   
   
   
  
 TRAVATAN OP Your last dose was: Your next dose is:    
   
   
 Apply  to eye. Refills:  0  
     
   
   
   
  
 * Notice: This list has 2 medication(s) that are the same as other medications prescribed for you. Read the directions carefully, and ask your doctor or other care provider to review them with you. STOP taking these medications KLOR-CON 10 10 mEq tablet Generic drug:  potassium chloride SR  
   
  
 LIPITOR 40 mg tablet Generic drug:  atorvastatin Where to Get Your Medications Information on where to get these meds will be given to you by the nurse or doctor. ! Ask your nurse or doctor about these medications  
  amLODIPine 5 mg tablet  
 clopidogrel 75 mg Tab  
 metoprolol tartrate 25 mg tablet  
 rosuvastatin 40 mg tablet

## 2017-05-13 NOTE — IP AVS SNAPSHOT
Summary of Care Report The Summary of Care report has been created to help improve care coordination. Users with access to [employer] or [address] (Web-based application) may access additional patient information including the Discharge Summary. If you are not currently a [address] user and need more information, please call the number listed below in the [place] [address] section and ask to be connected with Medical Records. Facility Information Name Address Phone [address] [facility] [address] [phone] Patient Information Patient Name Sex BHARAT Rayn ([id number]) Male 1945 Discharge Information Admitting Provider Service Area Unit Mina Tompkins III, MD / [address] 2 [facility] / [phone] Discharge Provider Discharge Date/Time Discharge Disposition Destination (none) 2017 (Pending) AHR (none) Patient Language Language ENGLISH [13] Hospital Problems as of 2017  Reviewed: 2017  3:05 PM by Michelene Habermann, MD  
  
  
  
 Class Noted - Resolved Last Modified POA Active Problems NSTEMI (non-ST elevated myocardial infarction) ([facility] [address]  2017 - Present 2017 by Kaye Gomez MD Unknown Entered by Kaye Gomez MD  
  Chronic kidney disease, stage III (moderate)  2017 - Present 2017 by Rossi Chavez MD Unknown Entered by Rossi Chavez MD  
  Anemia  2017 - Present 2017 by Rossi Chavez MD Unknown Entered by Rossi Chavez MD  
  Hypertension  2017 - Present 2017 by Rossi Chavez MD Unknown Entered by Rossi Chavez MD  
  * (Principal)Heme positive stool  2017 - Present 2017 by Michelene Habermann, MD Yes   Entered by Michelene Habermann, MD  
  
Non-Hospital Problems as of 2017  Reviewed: 2017  3:05 PM by Holly Oseguera MD  
 None You are allergic to the following No active allergies Current Discharge Medication List  
  
START taking these medications Dose & Instructions Dispensing Information Comments  
 clopidogrel 75 mg Tab Commonly known as:  PLAVIX Dose:  75 mg Take 1 Tab by mouth daily. Quantity:  30 Tab Refills:  0  
   
 metoprolol tartrate 25 mg tablet Commonly known as:  LOPRESSOR Dose:  12.5 mg Take 0.5 Tabs by mouth two (2) times a day. Quantity:  60 Tab Refills:  0  
   
 rosuvastatin 40 mg tablet Commonly known as:  CRESTOR Dose:  40 mg Take 1 Tab by mouth nightly. Quantity:  30 Tab Refills:  0 CONTINUE these medications which have CHANGED Dose & Instructions Dispensing Information Comments  
 amLODIPine 5 mg tablet Commonly known as:  García Carney What changed:  how much to take Dose:  10 mg Take 2 Tabs by mouth daily. Quantity:  20 Tab Refills:  0 Increased dose CONTINUE these medications which have NOT CHANGED Dose & Instructions Dispensing Information Comments ALLOPURINOL PO Dose:  150 mg Take 150 mg by mouth daily. Refills:  0  
   
 aspirin 81 mg chewable tablet Dose:  81 mg Take 81 mg by mouth daily. Refills:  0  
   
 AVODART 0.5 mg capsule Generic drug:  dutasteride Dose:  0.5 mg Take 0.5 mg by mouth daily. Refills:  0  
   
 bumetanide 1 mg tablet Commonly known as:  Doc Ramírez Dose:  1 mg Take 1 mg by mouth two (2) times a day. Refills:  0  
   
 cholecalciferol (VITAMIN D3) 5,000 unit Tab tablet Commonly known as:  VITAMIN D3 Dose:  5000 Units Take 5,000 Units by mouth daily. Refills:  0  
   
 * FOLBIC PO Take  by mouth. Refills:  0  
   
 * VIRT-NABIL 2.5-25-1 mg tablet Generic drug:  folic acid-vit E8-ZDR X26 Dose:  1 Tab Take 1 Tab by mouth daily. Refills:  0  
   
 glimepiride 2 mg tablet Commonly known as:  AMARYL Dose:  2 mg Take 2 mg by mouth every morning. Refills:  0  
   
 hydrALAZINE 50 mg tablet Commonly known as:  APRESOLINE Dose:  50 mg Take 50 mg by mouth two (2) times a day. Refills:  0 IMDUR 30 mg tablet Generic drug:  isosorbide mononitrate ER Dose:  30 mg Take 30 mg by mouth daily. Refills:  0 KEPPRA PO Dose:  1500 mg Take 1,500 mg by mouth two (2) times a day. Refills:  0  
   
 levothyroxine 100 mcg tablet Commonly known as:  SYNTHROID Dose:  100 mcg Take 100 mcg by mouth Daily (before breakfast). Refills:  0  
   
 losartan 50 mg tablet Commonly known as:  COZAAR Dose:  50 mg Take 50 mg by mouth daily. Refills:  0  
   
 predniSONE 5 mg tablet Commonly known as:  Jay Hug Dose:  5 mg Take 5 mg by mouth daily. 2.5 mg in am  
 Refills:  0  
   
 TRAVATAN OP Apply  to eye. Refills:  0  
   
 * Notice: This list has 2 medication(s) that are the same as other medications prescribed for you. Read the directions carefully, and ask your doctor or other care provider to review them with you. STOP taking these medications Comments KLOR-CON 10 10 mEq tablet Generic drug:  potassium chloride SR  
   
   
 LIPITOR 40 mg tablet Generic drug:  atorvastatin Follow-up Information Follow up With Details Comments Contact Info Dr. Myrtle Lu  Patient states he has a follow-up appointment with PCP on 05/23/17 Internal Medicine [address] [facility], [place] [place] [address] Phone: [phone] Dr. Dagoberto Tucker   Patient states he has a follow-up appointment with cardiologist on 05/23/17 Cardiologist: 
[address] [place] [address] [address], [place], [address] [facility] Phone: [phone] Dr. Arndt Code  Please make appointment asap with kidney doctor Nephrology: 
[id number], [place], [address] Phone: [phone] Discharge Instructions DISCHARGE SUMMARY from Nurse The following personal items are in your possession at time of discharge: 
 
Dental Appliances: None Visual Aid: None Home Medications: Sent home Jewelry: Ring Clothing: At bedside Other Valuables: None PATIENT INSTRUCTIONS: 
 
After general anesthesia or intravenous sedation, for 24 hours or while taking prescription Narcotics: · Limit your activities · Do not drive and operate hazardous machinery · Do not make important personal or business decisions · Do  not drink alcoholic beverages · If you have not urinated within 8 hours after discharge, please contact your surgeon on call. Report the following to your cardiologist 
· Excessive pain, swelling, redness or odor of or around the surgical area · Temperature over 101 · Nausea and vomiting lasting longer than 4 hours or if unable to take medications · Any signs of decreased circulation or nerve impairment to extremity: change in color, persistent  numbness, tingling, coldness or increase pain · Any questions Cardiac Catheterization/Angiography Discharge Instructions *Check the puncture site frequently for swelling or bleeding. If you see any bleeding, lie down and apply pressure over the area with a clean town or washcloth. Notify your doctor for any redness, swelling, drainage or oozing from the puncture site. Notify your doctor for any fever or chills. *If the leg or arm with the puncture becomes cold, numb or painful, call Cardiologist  
 
*Activity should be limited for the next 48 hours. Climb stairs as little as possible and avoid any stooping, bending or strenuous activity for 48 hours. No heavy lifting (anything over 10 pounds) for three days. *Do not drive for 48 hours. *You may resume your usual diet. Drink more fluids than usual. 
 
*Have a responsible person drive you home and stay with you for at least 24 hours after your heart catheterization/angiography. *You may remove the bandage from your Right and Arm in 24 hours. You may shower in 24 hours. No tub baths, hot tubs or swimming for one week. Do not place any lotions, creams, powders, ointments over the puncture site for one week. You may place a clean band-aid over the puncture site each day for 5 days. Change this daily. What to do at Home: 
Recommended activity: Activity as tolerated and no driving for today and No heavy lifting, pushing, until follow up with cardiologist 
 
If you experience any of the following symptoms fever chills numbness tingling to affected site , please follow up with cardiologist. 
 
 
*  Please give a list of your current medications to your Primary Care Provider. *  Please update this list whenever your medications are discontinued, doses are 
    changed, or new medications (including over-the-counter products) are added. *  Please carry medication information at all times in case of emergency situations. These are general instructions for a healthy lifestyle: No smoking/ No tobacco products/ Avoid exposure to second hand smoke Surgeon General's Warning:  Quitting smoking now greatly reduces serious risk to your health. Obesity, smoking, and sedentary lifestyle greatly increases your risk for illness A healthy diet, regular physical exercise & weight monitoring are important for maintaining a healthy lifestyle You may be retaining fluid if you have a history of heart failure or if you experience any of the following symptoms:  Weight gain of 3 pounds or more overnight or 5 pounds in a week, increased swelling in our hands or feet or shortness of breath while lying flat in bed. Please call your doctor as soon as you notice any of these symptoms; do not wait until your next office visit. Recognize signs and symptoms of STROKE: 
 
F-face looks uneven A-arms unable to move or move unevenly S-speech slurred or non-existent T-time-call 911 as soon as signs and symptoms begin-DO NOT go Back to bed or wait to see if you get better-TIME IS BRAIN. Warning Signs of HEART ATTACK Call 911 if you have these symptoms: 
? Chest discomfort. Most heart attacks involve discomfort in the center of the chest that lasts more than a few minutes, or that goes away and comes back. It can feel like uncomfortable pressure, squeezing, fullness, or pain. ? Discomfort in other areas of the upper body. Symptoms can include pain or discomfort in one or both arms, the back, neck, jaw, or stomach. ? Shortness of breath with or without chest discomfort. ? Other signs may include breaking out in a cold sweat, nausea, or lightheadedness. Don't wait more than five minutes to call [address]! Fast action can save your life. Calling 911 is almost always the fastest way to get lifesaving treatment. Emergency Medical Services staff can begin treatment when they arrive  up to an hour sooner than if someone gets to the hospital by car. The discharge information has been reviewed with the patient and spouse. The patient and spouse verbalized understanding. Discharge medications reviewed with the patient and spouse and appropriate educational materials and side effects teaching were provided. Patient armband removed and shredded Chart Review Routing History No Routing History on File

## 2017-05-14 LAB
ALBUMIN SERPL BCP-MCNC: 3 G/DL (ref 3.4–5)
ALBUMIN/GLOB SERPL: 0.8 {RATIO} (ref 0.8–1.7)
ALP SERPL-CCNC: 68 U/L (ref 45–117)
ALT SERPL-CCNC: 24 U/L (ref 16–61)
ANION GAP BLD CALC-SCNC: 10 MMOL/L (ref 3–18)
APPEARANCE UR: CLEAR
APTT PPP: 61.8 SEC (ref 23–36.4)
APTT PPP: 73.7 SEC (ref 23–36.4)
APTT PPP: 97.7 SEC (ref 23–36.4)
AST SERPL W P-5'-P-CCNC: 24 U/L (ref 15–37)
ATRIAL RATE: 110 BPM
ATRIAL RATE: 55 BPM
BACTERIA URNS QL MICRO: NEGATIVE /HPF
BASOPHILS # BLD AUTO: 0 K/UL (ref 0–0.06)
BASOPHILS # BLD: 0 % (ref 0–2)
BILIRUB DIRECT SERPL-MCNC: <0.1 MG/DL (ref 0–0.2)
BILIRUB SERPL-MCNC: 0.2 MG/DL (ref 0.2–1)
BILIRUB UR QL: NEGATIVE
BUN SERPL-MCNC: 106 MG/DL (ref 7–18)
BUN/CREAT SERPL: 33 (ref 12–20)
CALCIUM SERPL-MCNC: 8.7 MG/DL (ref 8.5–10.1)
CALCULATED P AXIS, ECG09: 100 DEGREES
CALCULATED P AXIS, ECG09: 41 DEGREES
CALCULATED R AXIS, ECG10: 13 DEGREES
CALCULATED R AXIS, ECG10: 18 DEGREES
CALCULATED T AXIS, ECG11: 114 DEGREES
CALCULATED T AXIS, ECG11: 123 DEGREES
CHLORIDE SERPL-SCNC: 104 MMOL/L (ref 100–108)
CO2 SERPL-SCNC: 27 MMOL/L (ref 21–32)
COLOR UR: YELLOW
CREAT SERPL-MCNC: 3.23 MG/DL (ref 0.6–1.3)
DIAGNOSIS, 93000: NORMAL
DIAGNOSIS, 93000: NORMAL
DIFFERENTIAL METHOD BLD: ABNORMAL
EOSINOPHIL # BLD: 0.2 K/UL (ref 0–0.4)
EOSINOPHIL NFR BLD: 2 % (ref 0–5)
EPITH CASTS URNS QL MICRO: NORMAL /LPF (ref 0–5)
ERYTHROCYTE [DISTWIDTH] IN BLOOD BY AUTOMATED COUNT: 15.9 % (ref 11.6–14.5)
GLOBULIN SER CALC-MCNC: 3.9 G/DL (ref 2–4)
GLUCOSE BLD STRIP.AUTO-MCNC: 166 MG/DL (ref 70–110)
GLUCOSE BLD STRIP.AUTO-MCNC: 184 MG/DL (ref 70–110)
GLUCOSE BLD STRIP.AUTO-MCNC: 276 MG/DL (ref 70–110)
GLUCOSE SERPL-MCNC: 160 MG/DL (ref 74–99)
GLUCOSE UR STRIP.AUTO-MCNC: NEGATIVE MG/DL
HCT VFR BLD AUTO: 27.1 % (ref 36–48)
HGB BLD-MCNC: 8.8 G/DL (ref 13–16)
HGB UR QL STRIP: NEGATIVE
INR PPP: 1 (ref 0.8–1.2)
KETONES UR QL STRIP.AUTO: NEGATIVE MG/DL
LEUKOCYTE ESTERASE UR QL STRIP.AUTO: NEGATIVE
LIPASE SERPL-CCNC: 470 U/L (ref 73–393)
LYMPHOCYTES # BLD AUTO: 16 % (ref 21–52)
LYMPHOCYTES # BLD: 1.6 K/UL (ref 0.9–3.6)
MCH RBC QN AUTO: 26.1 PG (ref 24–34)
MCHC RBC AUTO-ENTMCNC: 32.5 G/DL (ref 31–37)
MCV RBC AUTO: 80.4 FL (ref 74–97)
MONOCYTES # BLD: 0.5 K/UL (ref 0.05–1.2)
MONOCYTES NFR BLD AUTO: 5 % (ref 3–10)
NEUTS SEG # BLD: 7.9 K/UL (ref 1.8–8)
NEUTS SEG NFR BLD AUTO: 77 % (ref 40–73)
NITRITE UR QL STRIP.AUTO: NEGATIVE
P-R INTERVAL, ECG05: 182 MS
P-R INTERVAL, ECG05: 208 MS
PH UR STRIP: 5 [PH] (ref 5–8)
PLATELET # BLD AUTO: 195 K/UL (ref 135–420)
PMV BLD AUTO: 10.4 FL (ref 9.2–11.8)
POTASSIUM SERPL-SCNC: 3.6 MMOL/L (ref 3.5–5.5)
PROT SERPL-MCNC: 6.9 G/DL (ref 6.4–8.2)
PROT UR STRIP-MCNC: NEGATIVE MG/DL
PROTHROMBIN TIME: 13.1 SEC (ref 11.5–15.2)
Q-T INTERVAL, ECG07: 346 MS
Q-T INTERVAL, ECG07: 482 MS
QRS DURATION, ECG06: 100 MS
QRS DURATION, ECG06: 104 MS
QTC CALCULATION (BEZET), ECG08: 461 MS
QTC CALCULATION (BEZET), ECG08: 468 MS
RBC # BLD AUTO: 3.37 M/UL (ref 4.7–5.5)
RBC #/AREA URNS HPF: NORMAL /HPF (ref 0–5)
SODIUM SERPL-SCNC: 141 MMOL/L (ref 136–145)
SP GR UR REFRACTOMETRY: 1.02 (ref 1–1.03)
TROPONIN I SERPL-MCNC: 2.83 NG/ML (ref 0–0.04)
TROPONIN I SERPL-MCNC: 3.3 NG/ML (ref 0–0.04)
TSH SERPL DL<=0.05 MIU/L-ACNC: 0.38 UIU/ML (ref 0.36–3.74)
UROBILINOGEN UR QL STRIP.AUTO: 0.2 EU/DL (ref 0.2–1)
VENTRICULAR RATE, ECG03: 110 BPM
VENTRICULAR RATE, ECG03: 55 BPM
WBC # BLD AUTO: 10.3 K/UL (ref 4.6–13.2)
WBC URNS QL MICRO: NORMAL /HPF (ref 0–4)

## 2017-05-14 PROCEDURE — 74011250637 HC RX REV CODE- 250/637

## 2017-05-14 PROCEDURE — 84443 ASSAY THYROID STIM HORMONE: CPT | Performed by: HOSPITALIST

## 2017-05-14 PROCEDURE — 84484 ASSAY OF TROPONIN QUANT: CPT | Performed by: HOSPITALIST

## 2017-05-14 PROCEDURE — 74011250637 HC RX REV CODE- 250/637: Performed by: HOSPITALIST

## 2017-05-14 PROCEDURE — 74011000258 HC RX REV CODE- 258: Performed by: HOSPITALIST

## 2017-05-14 PROCEDURE — 85025 COMPLETE CBC W/AUTO DIFF WBC: CPT | Performed by: HOSPITALIST

## 2017-05-14 PROCEDURE — 80076 HEPATIC FUNCTION PANEL: CPT | Performed by: HOSPITALIST

## 2017-05-14 PROCEDURE — 74011250637 HC RX REV CODE- 250/637: Performed by: EMERGENCY MEDICINE

## 2017-05-14 PROCEDURE — 74011250636 HC RX REV CODE- 250/636: Performed by: HOSPITALIST

## 2017-05-14 PROCEDURE — 77010033678 HC OXYGEN DAILY

## 2017-05-14 PROCEDURE — 74011636637 HC RX REV CODE- 636/637: Performed by: HOSPITALIST

## 2017-05-14 PROCEDURE — 85610 PROTHROMBIN TIME: CPT | Performed by: HOSPITALIST

## 2017-05-14 PROCEDURE — 93005 ELECTROCARDIOGRAM TRACING: CPT

## 2017-05-14 PROCEDURE — 74011250636 HC RX REV CODE- 250/636

## 2017-05-14 PROCEDURE — 81001 URINALYSIS AUTO W/SCOPE: CPT | Performed by: HOSPITALIST

## 2017-05-14 PROCEDURE — 74011250636 HC RX REV CODE- 250/636: Performed by: EMERGENCY MEDICINE

## 2017-05-14 PROCEDURE — 83690 ASSAY OF LIPASE: CPT | Performed by: HOSPITALIST

## 2017-05-14 PROCEDURE — 65660000004 HC RM CVT STEPDOWN

## 2017-05-14 PROCEDURE — 80048 BASIC METABOLIC PNL TOTAL CA: CPT | Performed by: HOSPITALIST

## 2017-05-14 PROCEDURE — 82962 GLUCOSE BLOOD TEST: CPT

## 2017-05-14 PROCEDURE — 85730 THROMBOPLASTIN TIME PARTIAL: CPT | Performed by: HOSPITALIST

## 2017-05-14 PROCEDURE — 36415 COLL VENOUS BLD VENIPUNCTURE: CPT | Performed by: HOSPITALIST

## 2017-05-14 PROCEDURE — 74011000250 HC RX REV CODE- 250: Performed by: HOSPITALIST

## 2017-05-14 RX ORDER — GUAIFENESIN 100 MG/5ML
81 LIQUID (ML) ORAL DAILY
Status: DISCONTINUED | OUTPATIENT
Start: 2017-05-15 | End: 2017-05-20 | Stop reason: HOSPADM

## 2017-05-14 RX ORDER — HEPARIN SODIUM 5000 [USP'U]/ML
5000 INJECTION, SOLUTION INTRAVENOUS; SUBCUTANEOUS EVERY 8 HOURS
Status: DISCONTINUED | OUTPATIENT
Start: 2017-05-14 | End: 2017-05-14

## 2017-05-14 RX ORDER — ALLOPURINOL 300 MG/1
150 TABLET ORAL DAILY
Status: DISCONTINUED | OUTPATIENT
Start: 2017-05-14 | End: 2017-05-20 | Stop reason: HOSPADM

## 2017-05-14 RX ORDER — ONDANSETRON 2 MG/ML
4 INJECTION INTRAMUSCULAR; INTRAVENOUS
Status: DISCONTINUED | OUTPATIENT
Start: 2017-05-14 | End: 2017-05-20 | Stop reason: HOSPADM

## 2017-05-14 RX ORDER — ATORVASTATIN CALCIUM 40 MG/1
40 TABLET, FILM COATED ORAL DAILY
Status: DISCONTINUED | OUTPATIENT
Start: 2017-05-14 | End: 2017-05-17

## 2017-05-14 RX ORDER — ACETAMINOPHEN 325 MG/1
650 TABLET ORAL
Status: DISCONTINUED | OUTPATIENT
Start: 2017-05-14 | End: 2017-05-20 | Stop reason: HOSPADM

## 2017-05-14 RX ORDER — DUTASTERIDE 0.5 MG/1
0.5 CAPSULE, LIQUID FILLED ORAL DAILY
Status: DISCONTINUED | OUTPATIENT
Start: 2017-05-14 | End: 2017-05-20 | Stop reason: HOSPADM

## 2017-05-14 RX ORDER — INSULIN LISPRO 100 [IU]/ML
INJECTION, SOLUTION INTRAVENOUS; SUBCUTANEOUS
Status: DISCONTINUED | OUTPATIENT
Start: 2017-05-14 | End: 2017-05-20 | Stop reason: HOSPADM

## 2017-05-14 RX ORDER — CLOPIDOGREL BISULFATE 75 MG/1
75 TABLET ORAL DAILY
Status: DISCONTINUED | OUTPATIENT
Start: 2017-05-14 | End: 2017-05-20 | Stop reason: HOSPADM

## 2017-05-14 RX ORDER — NITROGLYCERIN 0.4 MG/1
0.4 TABLET SUBLINGUAL
Status: DISCONTINUED | OUTPATIENT
Start: 2017-05-14 | End: 2017-05-20 | Stop reason: HOSPADM

## 2017-05-14 RX ORDER — LOSARTAN POTASSIUM 25 MG/1
50 TABLET ORAL DAILY
Status: DISCONTINUED | OUTPATIENT
Start: 2017-05-14 | End: 2017-05-14

## 2017-05-14 RX ORDER — ADHESIVE BANDAGE
30 BANDAGE TOPICAL DAILY PRN
Status: DISCONTINUED | OUTPATIENT
Start: 2017-05-14 | End: 2017-05-20 | Stop reason: HOSPADM

## 2017-05-14 RX ORDER — ZOLPIDEM TARTRATE 5 MG/1
5 TABLET ORAL
Status: DISCONTINUED | OUTPATIENT
Start: 2017-05-14 | End: 2017-05-14

## 2017-05-14 RX ORDER — HEPARIN SODIUM 1000 [USP'U]/ML
INJECTION, SOLUTION INTRAVENOUS; SUBCUTANEOUS
Status: COMPLETED
Start: 2017-05-14 | End: 2017-05-14

## 2017-05-14 RX ORDER — AMLODIPINE BESYLATE 5 MG/1
5 TABLET ORAL DAILY
Status: DISCONTINUED | OUTPATIENT
Start: 2017-05-14 | End: 2017-05-16

## 2017-05-14 RX ORDER — POTASSIUM CHLORIDE 20 MEQ/1
TABLET, EXTENDED RELEASE ORAL
Status: COMPLETED
Start: 2017-05-14 | End: 2017-05-14

## 2017-05-14 RX ORDER — ONDANSETRON 2 MG/ML
4 INJECTION INTRAMUSCULAR; INTRAVENOUS
Status: DISCONTINUED | OUTPATIENT
Start: 2017-05-14 | End: 2017-05-14 | Stop reason: CLARIF

## 2017-05-14 RX ORDER — MELATONIN
5000 DAILY
Status: DISCONTINUED | OUTPATIENT
Start: 2017-05-14 | End: 2017-05-20 | Stop reason: HOSPADM

## 2017-05-14 RX ORDER — NALOXONE HYDROCHLORIDE 0.4 MG/ML
0.4 INJECTION, SOLUTION INTRAMUSCULAR; INTRAVENOUS; SUBCUTANEOUS AS NEEDED
Status: DISCONTINUED | OUTPATIENT
Start: 2017-05-14 | End: 2017-05-20 | Stop reason: HOSPADM

## 2017-05-14 RX ORDER — HEPARIN SODIUM 1000 [USP'U]/ML
3000 INJECTION, SOLUTION INTRAVENOUS; SUBCUTANEOUS ONCE
Status: COMPLETED | OUTPATIENT
Start: 2017-05-14 | End: 2017-05-14

## 2017-05-14 RX ORDER — MORPHINE SULFATE 2 MG/ML
2 INJECTION, SOLUTION INTRAMUSCULAR; INTRAVENOUS
Status: DISCONTINUED | OUTPATIENT
Start: 2017-05-14 | End: 2017-05-20 | Stop reason: HOSPADM

## 2017-05-14 RX ORDER — BUMETANIDE 1 MG/1
1 TABLET ORAL 2 TIMES DAILY
Status: DISCONTINUED | OUTPATIENT
Start: 2017-05-14 | End: 2017-05-14

## 2017-05-14 RX ORDER — DEXTROSE 50 % IN WATER (D50W) INTRAVENOUS SYRINGE
25-50 AS NEEDED
Status: DISCONTINUED | OUTPATIENT
Start: 2017-05-14 | End: 2017-05-20 | Stop reason: HOSPADM

## 2017-05-14 RX ORDER — HYDRALAZINE HYDROCHLORIDE 50 MG/1
50 TABLET, FILM COATED ORAL 2 TIMES DAILY
Status: DISCONTINUED | OUTPATIENT
Start: 2017-05-14 | End: 2017-05-16

## 2017-05-14 RX ORDER — METOPROLOL TARTRATE 25 MG/1
12.5 TABLET, FILM COATED ORAL EVERY 6 HOURS
Status: DISCONTINUED | OUTPATIENT
Start: 2017-05-14 | End: 2017-05-16

## 2017-05-14 RX ORDER — DIPHENHYDRAMINE HYDROCHLORIDE 50 MG/ML
12.5 INJECTION, SOLUTION INTRAMUSCULAR; INTRAVENOUS
Status: DISCONTINUED | OUTPATIENT
Start: 2017-05-14 | End: 2017-05-20 | Stop reason: HOSPADM

## 2017-05-14 RX ORDER — MAGNESIUM SULFATE 100 %
16 CRYSTALS MISCELLANEOUS AS NEEDED
Status: DISCONTINUED | OUTPATIENT
Start: 2017-05-14 | End: 2017-05-20 | Stop reason: HOSPADM

## 2017-05-14 RX ORDER — ISOSORBIDE MONONITRATE 30 MG/1
30 TABLET, EXTENDED RELEASE ORAL DAILY
Status: DISCONTINUED | OUTPATIENT
Start: 2017-05-14 | End: 2017-05-20 | Stop reason: HOSPADM

## 2017-05-14 RX ORDER — DEXTROSE MONOHYDRATE AND SODIUM CHLORIDE 5; .9 G/100ML; G/100ML
125 INJECTION, SOLUTION INTRAVENOUS CONTINUOUS
Status: DISCONTINUED | OUTPATIENT
Start: 2017-05-14 | End: 2017-05-14

## 2017-05-14 RX ORDER — PREDNISONE 10 MG/1
5 TABLET ORAL DAILY
Status: DISCONTINUED | OUTPATIENT
Start: 2017-05-14 | End: 2017-05-20 | Stop reason: HOSPADM

## 2017-05-14 RX ORDER — HEPARIN SODIUM 10000 [USP'U]/100ML
12-25 INJECTION, SOLUTION INTRAVENOUS
Status: DISCONTINUED | OUTPATIENT
Start: 2017-05-14 | End: 2017-05-14

## 2017-05-14 RX ORDER — SODIUM CHLORIDE 9 MG/ML
100 INJECTION, SOLUTION INTRAVENOUS CONTINUOUS
Status: DISCONTINUED | OUTPATIENT
Start: 2017-05-14 | End: 2017-05-18

## 2017-05-14 RX ORDER — POTASSIUM CHLORIDE 750 MG/1
10 TABLET, EXTENDED RELEASE ORAL 2 TIMES DAILY
Status: DISCONTINUED | OUTPATIENT
Start: 2017-05-14 | End: 2017-05-20 | Stop reason: HOSPADM

## 2017-05-14 RX ORDER — NITROGLYCERIN 20 MG/1
1 PATCH TRANSDERMAL DAILY
Status: DISCONTINUED | OUTPATIENT
Start: 2017-05-14 | End: 2017-05-14

## 2017-05-14 RX ORDER — LEVOTHYROXINE SODIUM 50 UG/1
100 TABLET ORAL
Status: DISCONTINUED | OUTPATIENT
Start: 2017-05-14 | End: 2017-05-20 | Stop reason: HOSPADM

## 2017-05-14 RX ORDER — DOCUSATE SODIUM 100 MG/1
100 CAPSULE, LIQUID FILLED ORAL 2 TIMES DAILY
Status: DISCONTINUED | OUTPATIENT
Start: 2017-05-14 | End: 2017-05-20 | Stop reason: HOSPADM

## 2017-05-14 RX ORDER — IBUPROFEN 200 MG
1 TABLET ORAL EVERY 24 HOURS
Status: DISCONTINUED | OUTPATIENT
Start: 2017-05-14 | End: 2017-05-14

## 2017-05-14 RX ORDER — POTASSIUM CHLORIDE 20 MEQ/1
40 TABLET, EXTENDED RELEASE ORAL
Status: COMPLETED | OUTPATIENT
Start: 2017-05-14 | End: 2017-05-14

## 2017-05-14 RX ORDER — LEVETIRACETAM 500 MG/1
1500 TABLET ORAL 2 TIMES DAILY
Status: DISCONTINUED | OUTPATIENT
Start: 2017-05-14 | End: 2017-05-20 | Stop reason: HOSPADM

## 2017-05-14 RX ORDER — GUAIFENESIN 100 MG/5ML
81 LIQUID (ML) ORAL DAILY
Status: DISCONTINUED | OUTPATIENT
Start: 2017-05-14 | End: 2017-05-14

## 2017-05-14 RX ORDER — ATORVASTATIN CALCIUM 20 MG/1
20 TABLET, FILM COATED ORAL
Status: DISCONTINUED | OUTPATIENT
Start: 2017-05-14 | End: 2017-05-14

## 2017-05-14 RX ADMIN — NITROGLYCERIN 0.5 INCH: 20 OINTMENT TOPICAL at 09:04

## 2017-05-14 RX ADMIN — HEPARIN SODIUM AND DEXTROSE 1200 UNITS/HR: 10000; 5 INJECTION INTRAVENOUS at 18:53

## 2017-05-14 RX ADMIN — ISOSORBIDE MONONITRATE 30 MG: 30 TABLET, EXTENDED RELEASE ORAL at 09:03

## 2017-05-14 RX ADMIN — POTASSIUM CHLORIDE 10 MEQ: 10 TABLET, EXTENDED RELEASE ORAL at 18:27

## 2017-05-14 RX ADMIN — SODIUM CHLORIDE 125 ML/HR: 900 INJECTION, SOLUTION INTRAVENOUS at 17:30

## 2017-05-14 RX ADMIN — HYDRALAZINE HYDROCHLORIDE 50 MG: 50 TABLET ORAL at 18:26

## 2017-05-14 RX ADMIN — PREDNISONE 5 MG: 10 TABLET ORAL at 09:01

## 2017-05-14 RX ADMIN — CHOLECALCIFEROL TAB 25 MCG (1000 UNIT) 5000 UNITS: 25 TAB at 09:00

## 2017-05-14 RX ADMIN — METOPROLOL TARTRATE 12.5 MG: 25 TABLET ORAL at 12:14

## 2017-05-14 RX ADMIN — POTASSIUM CHLORIDE 10 MEQ: 10 TABLET, EXTENDED RELEASE ORAL at 09:02

## 2017-05-14 RX ADMIN — POTASSIUM CHLORIDE 40 MEQ: 20 TABLET, EXTENDED RELEASE ORAL at 01:06

## 2017-05-14 RX ADMIN — DOCUSATE SODIUM 100 MG: 100 CAPSULE, LIQUID FILLED ORAL at 18:27

## 2017-05-14 RX ADMIN — BUMETANIDE 1 MG: 1 TABLET ORAL at 12:13

## 2017-05-14 RX ADMIN — POTASSIUM CHLORIDE 20 MEQ: 20 TABLET, EXTENDED RELEASE ORAL at 01:49

## 2017-05-14 RX ADMIN — Medication 1 TABLET: at 09:00

## 2017-05-14 RX ADMIN — DEXTROSE MONOHYDRATE AND SODIUM CHLORIDE 125 ML/HR: 5; .9 INJECTION, SOLUTION INTRAVENOUS at 08:38

## 2017-05-14 RX ADMIN — LEVETIRACETAM 1500 MG: 500 TABLET ORAL at 09:01

## 2017-05-14 RX ADMIN — NITROGLYCERIN 0.5 INCH: 20 OINTMENT TOPICAL at 18:25

## 2017-05-14 RX ADMIN — DUTASTERIDE 0.5 MG: 0.5 CAPSULE, LIQUID FILLED ORAL at 12:13

## 2017-05-14 RX ADMIN — INSULIN LISPRO 2 UNITS: 100 INJECTION, SOLUTION INTRAVENOUS; SUBCUTANEOUS at 18:22

## 2017-05-14 RX ADMIN — HEPARIN SODIUM 3000 UNITS: 1000 INJECTION, SOLUTION INTRAVENOUS; SUBCUTANEOUS at 08:59

## 2017-05-14 RX ADMIN — CLOPIDOGREL BISULFATE 75 MG: 75 TABLET ORAL at 09:02

## 2017-05-14 RX ADMIN — ONDANSETRON 4 MG: 2 INJECTION INTRAMUSCULAR; INTRAVENOUS at 01:47

## 2017-05-14 RX ADMIN — HYDRALAZINE HYDROCHLORIDE 50 MG: 50 TABLET ORAL at 09:02

## 2017-05-14 RX ADMIN — ATORVASTATIN CALCIUM 40 MG: 40 TABLET, FILM COATED ORAL at 09:03

## 2017-05-14 RX ADMIN — INSULIN LISPRO 2 UNITS: 100 INJECTION, SOLUTION INTRAVENOUS; SUBCUTANEOUS at 21:32

## 2017-05-14 RX ADMIN — BIMATOPROST 1 DROP: 0.1 SOLUTION/ DROPS OPHTHALMIC at 18:44

## 2017-05-14 RX ADMIN — LOSARTAN POTASSIUM 50 MG: 25 TABLET ORAL at 09:20

## 2017-05-14 RX ADMIN — DOCUSATE SODIUM 100 MG: 100 CAPSULE, LIQUID FILLED ORAL at 09:03

## 2017-05-14 RX ADMIN — LEVOTHYROXINE SODIUM 100 MCG: 100 TABLET ORAL at 09:00

## 2017-05-14 RX ADMIN — LEVETIRACETAM 1500 MG: 500 TABLET ORAL at 18:27

## 2017-05-14 RX ADMIN — AMLODIPINE BESYLATE 5 MG: 5 TABLET ORAL at 09:01

## 2017-05-14 RX ADMIN — METOPROLOL TARTRATE 12.5 MG: 25 TABLET ORAL at 09:04

## 2017-05-14 RX ADMIN — ALLOPURINOL 150 MG: 300 TABLET ORAL at 09:03

## 2017-05-14 NOTE — PROGRESS NOTES
Telephone turnover given to me by RN Justino Abarca. Pt arrived via medical transport. Heparin drip still infusing at 10 ml per hour. Pt is awake and alert. Pt denies pain and denies shortness of breath. Turnover given to [employer]. Pt is on cardiac telemetry monitor with heparin drip still in place. Stefany informed new orders are being placed currently. Sbar, Mar, Ed summary and evening events given in turnover.   Pt's wife Marlena Zee 78 or [phone]

## 2017-05-14 NOTE — H&P
[facility]  ROUTINE H AND PS    Name:  Asya Ndiaye  MR#:  [id number]  :  1945  Account #:  [de-identified]  Date of Adm:  2017      CHIEF COMPLAINT:  Chest tightness. HISTORY OF PRESENT ILLNESS:  The patient is a 64-year-old black  male who is visiting from [place], [place]. The patient attended a  surprise birthday party for friend and has previously lived in this area. He developed chest tightness while he was with his wife driving back  to the hotel where he was staying. He stopped at a [employer] to get  a Mother's Day care and had a sensation of needing to regurgitate. He  had tightness in his chest and palpitations. He sat in his truck, and he  told his wife he felt funny. His wife subsequently drove him to the  [place] [address] Emergency Room, where he was further evaluated. While at [place] [address], he had some abnormal labs and nonspecific  ST-T changes on his EKG. His troponin was 0.9. D-dimer was  elevated at 1.32, and his lipase was elevated at 587. He was given an  inch of nitroglycerin paste and a baby aspirin at x3, and he was started  on a heparin drip. His care was discussed with Olaf Delgadillo P.A.-C, with Cardiology, and the [facility] subsequently [facility] Service, with  plans for Cardiology to follow as consultants. He states that he is completely pain-free at this time. He has not had  any recurrence of pain since [facility]. PAST MEDICAL HISTORY:  His past medical history is significant for  coronary artery disease. He had a stent placed back in . He has  essential hypertension, and he states he has been told that he is  borderline diabetic. ALLERGIES:  HE HAS NO KNOWN ALLERGIES. FAMILY HISTORY:  His family history is significant for his father had a  myocardial infarction while in his 62s. SOCIAL HISTORY:  He is .   His wife's name is Ryan Stapleton. Her  phone number is [phone]. He denies any history of tobacco,  alcohol, or IV drug abuse. 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 CODE STATUS:  HE IS FULL CODE STATUS. REVIEW OF SYSTEMS:  He states he is not having any chest pain or  tightness currently. He denies feeling short of breath. He has not had  any problems with his breathing. He had that episode of feeling  nauseated, but has not vomited. He has not had any abdominal pain. He denies any pain in the right upper quadrant area or midepigastric  area. He has not had any melenic stool or bright red blood per  rectum. No dysuria or gross hematuria. He has not had any  headache, blurred vision, double vision, lightheadedness, dizziness,  increased frequency, or increased thirst.  No sensation of wanting to  pass out. He denies having any palpitations. He states he has not  had any swelling in his lower extremities or pain in his calf or thigh  areas. No skin rash or pruritus. His remaining 14-point review of  systems is negative. PHYSICAL EXAMINATION  GENERAL APPEARANCE:  On physical exam, he is a moderately  obese black male. He appears his stated age. He is in no distress. VITAL SIGNS:  His vital signs reveal his temperature is 97.5, pulse 60,  respirations 18, blood pressure 169/80, and his O2 saturations were  96% on room air. His height is 5 feet 7 inches. He weighs 261  pounds. SKIN:  Without jaundice or pallor. There are no significant areas of  breakdown. HEENT:  Normocephalic and atraumatic. Pupils are equally round and  react to light and accommodation. Extraocular movements are intact. The sclerae are anicteric. His oral cavity is moist.  NECK:  Supple. No JVD, carotid bruits, or goiter. No cervical  lymphadenopathy. LUNGS:  His lungs have breath sounds bilaterally. No wheezing or  rales.   No areas of consolidation. No dullness on percussion. HEART:  His heart has a regular rate and rhythm. Normal S1 and S2. No S3 gallop, murmur, or rub. ABDOMEN:  His abdomen is soft. There are no masses. No rebound  tenderness or guarding. He has bowel sounds present and  normoactive. BACK:  His back has a midline spine. He had no CVA or flank  tenderness. EXTREMITIES:  His extremities are without clubbing, cyanosis, or  edema. No palpable cords. Dorsalis pedis pulses are palpable  bilaterally. Capillary refill time was within normal limits. He moves all  extremities. NEUROLOGIC:  On his neurological exam, he is alert and oriented x3. His speech has a regular rate and rhythm. He has an appropriate  response to light touch and painful stimuli. He cooperates with exam  and follows my commands. His mood and affect are unremarkable. LABORATORY DATA:  His labs revealed his white count was 14.6,  hemoglobin was 10.4, hematocrit was 32.5, and his platelets were  537. He had 86 segs and 9 lymphs on the differential.  His PTT was  25.7. His D-dimer was 1.36. His sodium was 139, potassium was 3.3,  chloride was 99, CO2 was 26, glucose was 254, , and  creatinine was 3.48. His calcium was 9.1. LFTs were within normal  limits. His lipase was 587. CK was 190, MB was 3.9, and index was  2.1, and troponin was 0.09 (not 0.9 as mentioned earlier; I was told it  was 0.9, but it was actually 0.09). His chest x-ray showed no acute  infiltrate or effusion. It did show some cardiomegaly. His EKG had  nonspecific ST-T wave abnormalities. Consider lateral ischemia. IMPRESSION AND PLAN  1. Non-ST elevated myocardial infarction:  He has borderline elevated  troponin of 0.09. The patient will have cardiac enzymes trendedAnd  troponin trended. We will have Cardiology evaluate the patient today. Dr. Adams Melvin at [facility] discussed the care with Des Collazo P.A.-C, who agrees to follow the patient.   We will continue nitroglycerin  paste for now, as well as heparin. 2. Chest tightness: This symptom has resolved. We will continue  medications as mentioned above. We also will give the patient a baby  aspirin daily. 3. Coronary artery disease:  The patient has had a stent in the past.  Workup will be as per Cardiology's recommendations. The patient will  be monitored on the step-down unit for now. 4. Essential hypertension:  His blood pressure is currently stable. 5. Elevated D-dimer:  A CTA of his chest was not done secondary to  his renal failure. I will order a V/Q scan to rule out pulmonary  embolus. The patient is on full-dose heparin. 6. Acute kidney injury:  The patient has a markedly elevated BUN and  creatinine. I suspect this is prerenal azotemia. He has a leukocytosis,  too, which is likely from a volume-contracted state. The patient will get  intravenous fluid hydration. I will repeat a basic metabolic profile in the  a.m.  7. Dehydration:  Intravenous fluid hydration, as mentioned above. We  will follow up a.m. labs. We will also check for orthostasis.         MD Ric Bush / [facility]  D:  05/14/2017   08:03  T:  05/14/2017   08:47  Job #:  [id number]

## 2017-05-14 NOTE — PROGRESS NOTES
Hospitalist Progress Note    Patient: Willem Carrillo MRN: [id number]  CSN: [id number]    YOB: 1945  Age: 66 y.o. Sex: male    DOA: 5/13/2017 LOS:  LOS: 1 day          He denies current chest pain or shortness of breath. He has seen a nephrologist in the past but unclear what his stage of kidney dz is. Wife states she has cpap in car and she will bring it in. They are in town from [facility] visiting family for mother's day. Assessment/Plan     1. NSTEMI - medical management and cards to consider cath pending improvement in renal function. 2. Hypertension - diuretics held, continue current regimen, cards following. 3. Hyperlipidemia. On home med Lipitor. Follow flp. 4. DM2 - ssi, check A1c  5. oksana - wife to bring in home cpap (ordered). 6. Elevated creatinine - unknown baseline - no records available electronically in cc - will attempt to obtain records in am. Check UA and nephrology to consult. 7. Leukocytosis improved - likely reactive in the setting of MI.  8. Anemia nc nc  9. Elevated D-dimer: CTA chest not done secondary to his renal failure. on full-dose heparin and follow VQ.  10. Morbid obesity Body mass index is 40.91 kg/(m^2). 11. Full code . Stepdown. I discussed the case with Dr. Alfredo Pineda and Dr. Lalo Brunner. Additional Notes:      Case discussed with:  [x]Patient  [x]Family  [x]Nursing  []Case Management  DVT Prophylaxis:  []Lovenox  []Hep SQ  []SCDs  []Coumadin   [x]On Heparin gtt    Vital signs/Intake and Output:  Visit Vitals    /69    Pulse (!) 54    Temp 97.6 °F (36.4 °C)    Resp 18    Ht 5' 7\" (1.702 m)    Wt 118.5 kg (261 lb 3.2 oz)    SpO2 98%    BMI 40.91 kg/m2     Current Shift:  05/14 0701 - 05/14 1900  In: -   Out: 750 [Urine:750]  Last three shifts:  05/12 1901 - 05/14 0700  In: 81.3 [I.V.:81.3]  Out: 220 [Urine:220]    Awake alert and oriented.  Obese  Ncat. perrl  RRR  cta b.l  Obese soft nt nd nabs  No edema  No focal deficit  No rash    Medications Reviewed      Labs: Results:       Chemistry Recent Labs      05/14/17 0751 05/13/17 1935   GLU  160*  254*   NA  141  139   K  3.6  3.3*   CL  104  99*   CO2  27  26   BUN  106*  112*   CREA  3.23*  3.48*   CA  8.7  9.1   AGAP  10  14   BUCR  33*  32*   AP  68  91   TP  6.9  8.3*   ALB  3.0*  3.8   GLOB  3.9  4.5*   AGRAT  0.8  0.8      CBC w/Diff Recent Labs      05/14/17 0751 05/13/17 1935   WBC  10.3  14.6*   RBC  3.37*  4.02*   HGB  8.8*  10.4*   HCT  27.1*  32.5*   PLT  195  248   GRANS  77*  86*   LYMPH  16*  9*   EOS  2  1      Cardiac Enzymes Recent Labs      05/13/17 1935   CPK  190   CKND1  2.1      Coagulation Recent Labs      05/14/17 0751 05/13/17 1935   PTP  13.1   --    INR  1.0   --    APTT  61.8*  25.7       Lipid Panel No results found for: CHOL, CHOLPOCT, CHOLX, CHLST, CHOLV, [id number], HDL, LDL, NLDLCT, DLDL, LDLC, DLDLP, 134441, VLDLC, VLDL, TGL, TGLX, TRIGL, [id number], TRIGP, TGLPOCT, [id number], CHHD, CHHDX   BNP No results for input(s): BNPP in the last 72 hours. Liver Enzymes Recent Labs      05/14/17 0751   TP  6.9   ALB  3.0*   AP  68   SGOT  24      Thyroid Studies Lab Results   Component Value Date/Time    TSH 0.38 05/14/2017 07:51 AM        Procedures/imaging: see electronic medical records for all procedures/Xrays and details which were not copied into this note but were reviewed prior to creation of Plan.

## 2017-05-14 NOTE — CONSULTS
[address]    Name:  Mike Narayan  MR#:  [id number]  :  1945  Account #:  [de-identified]  Date of Adm:  2017  Date of Consultation:  2017      CARDIOLOGY CONSULTATION    REASON FOR CONSULTATION: Chest pain, abnormal enzymes. HISTORY: The patient is a 66-year-old severely obese [facility]  American male who is visiting here from [facility], went to a party  yesterday and on the way back was at [facility] when he felt  nauseous, had mild vomiting and chest pressure and his wife drove  him to the emergency room where enzymes were abnormal and he  was admitted. There was no radiation of this chest discomfort, no  associated diaphoresis, but he did feel mildly short of breath along with  the nausea and mild vomiting. He has not had any recurrence of  symptoms. IV heparin was started as the enzymes came abnormal.  Since admission the enzymes do show upward trend confirming a non-  ST elevation MI. EKG has shown nonspecific ST-T changes with  possible ischemia. 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 He  has had chronic edema and takes diuretics and is followed by a  nephrologist and a cardiologist in [facility]. As per his wife, the  patient drinks a tremendous amount of water on a daily basis. Denies  any palpitations, severe dizziness or loss of consciousness. PAST HISTORY: Significant for coronary artery disease and has had a  stent done in  by Dr. Mansoor Dubon. They used to live here and  they left  this area about 6-7 years ago. Hypertension, hyperlipidemia, diabetes,  for which medications were started a couple of months ago only. REVIEW OF SYSTEMS  No strokes, seizures, bleeding disorders. No recent fever, cough, cold,  vomiting, diarrhea, hematuria, dysuria. No chronic liver or lung disease  known.  He is aware of chronic kidney disease. Also, has sleep apnea  and uses CPAP. ALLERGIES: NONE KNOWN. MEDICATIONS: IN THE HOSPITAL INCLUDE:  1. IV heparin. 2. Aspirin. 3. Norvasc. 4. Allopurinol. 5. Lipitor. 6. Eye drops for glaucoma. 7. Vitamins. 8. Plavix. 9. Avodart. 10. Colace. 11. Folic acid. 12. Hydralazine. 13. Imdur. 14. Keppra. 15. Synthroid. 16. Cozaar. 17. Metoprolol. 18. Nitro paste. 19. Prednisone 5 mg a day. 20. Potassium replacement. HOME MEDICATIONS: Also include Amaryl that was started a couple  of months ago per the patient. PERSONAL HISTORY: Quit smoking more than 20-30 years ago. No  alcohol or drug abuse. FAMILY HISTORY: Negative for premature coronary disease or stroke. PHYSICAL EXAMINATION  GENERAL: The patient is alert, oriented, pleasant, cooperative, no  acute distress. HEENT: Normocephalic, atraumatic. VITAL SIGNS: Heart rate is in 50s and 60s, did have tachycardia on  admission and heart rate has come down with IV fluids. Blood pressure  146/68. Was high on admission and is coming down. HEENT: Normocephalic, atraumatic. NECK: JVD is difficult to see due to obesity, but does not appear to be  elevated. No bruits in the neck. LUNGS: No rales or rhonchi. CARDIAC: S1, S2 regular. Did not appreciate any murmur, rub, or  gallop. ABDOMEN: Severely obese, soft, nontender. EXTREMITIES: 1+ pitting edema. No clubbing or cyanosis. Distal  pulses are present. LABORATORY DATA: White count of 10.3, down from 14.6 on  admission, hemoglobin 8.8, down from 10.4 on admission, likely due to  IV fluids. Platelet count is normal. PT/INR are normal. PTT is elevated  due to IV heparin. It was normal on admission. D-dimer was elevated  at 1.36 on admission. Chemistry with K 3.6, , creatinine 3.2,  which are down from 112 and 3.5 on admission.  Liver enzymes are  normal. Troponin first set was 0.09 and the second set is 3.3, MB in  the first set was 3.9 with negative index and has not been repeated. STUDIES: X-ray chest without any cardiopulmonary disease, infiltrate  or effusion. A 12-lead EKG with sinus tachycardia on admission, possible old  inferior MI. Deep inversions in lateral leads, possible ischemia. IMPRESSION:  1. Acute non-ST elevation myocardial infarction and multiple risk  factors. Will need to be treated medically for now and as kidney  function stabilizes and improves the cardiac catheterization should be  considered to evaluate coronary arteries. 2. Hypertension, I will hold her diuretics because of renal insufficiency. Will need to follow the blood pressure closely though they have  improved from admission. 3. Hyperlipidemia. Will check a lipid profile and continue Lipitor that he  takes at home. 4. Diabetes, treatment per medical team.  5. Severe obesity with sleep apnea on CPAP. Explained to the patient  the need to lose weight. He understands it. In the meantime, we will  watch on medications. 6. Renal insufficiency, which may be advanced. I will hold Losartan for  now and it may help resolving renal function relatively quickly. We can  definitely consider it later to start again. Will follow closely from cardiac standpoint.     Thanks for your kind referral.        MD KRISTINA Lew / Rose Ariza  D:  05/14/2017   13:58  T:  05/14/2017   15:55  Job #:  [id number]

## 2017-05-14 NOTE — ED TRIAGE NOTES
Patient C/O mid sternal chest pain starting ~ 30 minutes ago [employer] for a mothers day card\".

## 2017-05-15 ENCOUNTER — APPOINTMENT (OUTPATIENT)
Dept: NUCLEAR MEDICINE | Age: 72
DRG: 247 | End: 2017-05-15
Attending: HOSPITALIST
Payer: MEDICARE

## 2017-05-15 LAB
APPEARANCE UR: CLEAR
APTT PPP: 78.4 SEC (ref 23–36.4)
APTT PPP: 99.5 SEC (ref 23–36.4)
BILIRUB UR QL: NEGATIVE
CHOLEST SERPL-MCNC: 203 MG/DL
COLOR UR: YELLOW
CREAT UR-MCNC: 75.6 MG/DL (ref 30–125)
CREAT UR-MCNC: 77.4 MG/DL (ref 30–125)
EST. AVERAGE GLUCOSE BLD GHB EST-MCNC: 200 MG/DL
GLUCOSE BLD STRIP.AUTO-MCNC: 116 MG/DL (ref 70–110)
GLUCOSE BLD STRIP.AUTO-MCNC: 118 MG/DL (ref 70–110)
GLUCOSE BLD STRIP.AUTO-MCNC: 158 MG/DL (ref 70–110)
GLUCOSE BLD STRIP.AUTO-MCNC: 222 MG/DL (ref 70–110)
GLUCOSE UR STRIP.AUTO-MCNC: NEGATIVE MG/DL
HBA1C MFR BLD: 8.6 % (ref 4.2–5.6)
HDLC SERPL-MCNC: 62 MG/DL (ref 40–60)
HDLC SERPL: 3.3 {RATIO} (ref 0–5)
HEMOCCULT STL QL: POSITIVE
HGB UR QL STRIP: NEGATIVE
KETONES UR QL STRIP.AUTO: NEGATIVE MG/DL
LDLC SERPL CALC-MCNC: 121.8 MG/DL (ref 0–100)
LEUKOCYTE ESTERASE UR QL STRIP.AUTO: NEGATIVE
LIPID PROFILE,FLP: ABNORMAL
NITRITE UR QL STRIP.AUTO: NEGATIVE
PH UR STRIP: 5 [PH] (ref 5–8)
PROT UR STRIP-MCNC: NEGATIVE MG/DL
PROT UR-MCNC: 8 MG/DL
PROT/CREAT UR-RTO: 0.1
SP GR UR REFRACTOMETRY: 1.02 (ref 1–1.03)
TRIGL SERPL-MCNC: 96 MG/DL (ref ?–150)
UROBILINOGEN UR QL STRIP.AUTO: 0.2 EU/DL (ref 0.2–1)
VLDLC SERPL CALC-MCNC: 19.2 MG/DL

## 2017-05-15 PROCEDURE — 74011636637 HC RX REV CODE- 636/637: Performed by: HOSPITALIST

## 2017-05-15 PROCEDURE — 82272 OCCULT BLD FECES 1-3 TESTS: CPT | Performed by: HOSPITALIST

## 2017-05-15 PROCEDURE — 80061 LIPID PANEL: CPT | Performed by: HOSPITALIST

## 2017-05-15 PROCEDURE — 81003 URINALYSIS AUTO W/O SCOPE: CPT | Performed by: INTERNAL MEDICINE

## 2017-05-15 PROCEDURE — 74011250637 HC RX REV CODE- 250/637: Performed by: HOSPITALIST

## 2017-05-15 PROCEDURE — 83036 HEMOGLOBIN GLYCOSYLATED A1C: CPT | Performed by: HOSPITALIST

## 2017-05-15 PROCEDURE — 82570 ASSAY OF URINE CREATININE: CPT | Performed by: INTERNAL MEDICINE

## 2017-05-15 PROCEDURE — C8929 TTE W OR WO FOL WCON,DOPPLER: HCPCS

## 2017-05-15 PROCEDURE — 85730 THROMBOPLASTIN TIME PARTIAL: CPT | Performed by: HOSPITALIST

## 2017-05-15 PROCEDURE — 36415 COLL VENOUS BLD VENIPUNCTURE: CPT | Performed by: HOSPITALIST

## 2017-05-15 PROCEDURE — 82962 GLUCOSE BLOOD TEST: CPT

## 2017-05-15 PROCEDURE — 65660000004 HC RM CVT STEPDOWN

## 2017-05-15 PROCEDURE — 84156 ASSAY OF PROTEIN URINE: CPT | Performed by: INTERNAL MEDICINE

## 2017-05-15 PROCEDURE — A9540 TC99M MAA: HCPCS

## 2017-05-15 PROCEDURE — 74011250636 HC RX REV CODE- 250/636: Performed by: INTERNAL MEDICINE

## 2017-05-15 PROCEDURE — 74011250637 HC RX REV CODE- 250/637: Performed by: EMERGENCY MEDICINE

## 2017-05-15 RX ADMIN — INSULIN LISPRO 4 UNITS: 100 INJECTION, SOLUTION INTRAVENOUS; SUBCUTANEOUS at 17:21

## 2017-05-15 RX ADMIN — PREDNISONE 5 MG: 10 TABLET ORAL at 08:48

## 2017-05-15 RX ADMIN — ATORVASTATIN CALCIUM 40 MG: 40 TABLET, FILM COATED ORAL at 08:42

## 2017-05-15 RX ADMIN — DOCUSATE SODIUM 100 MG: 100 CAPSULE, LIQUID FILLED ORAL at 17:05

## 2017-05-15 RX ADMIN — PERFLUTREN 2 ML: 6.52 INJECTION, SUSPENSION INTRAVENOUS at 12:38

## 2017-05-15 RX ADMIN — ISOSORBIDE MONONITRATE 30 MG: 30 TABLET, EXTENDED RELEASE ORAL at 08:41

## 2017-05-15 RX ADMIN — INSULIN LISPRO 2 UNITS: 100 INJECTION, SOLUTION INTRAVENOUS; SUBCUTANEOUS at 13:21

## 2017-05-15 RX ADMIN — AMLODIPINE BESYLATE 5 MG: 5 TABLET ORAL at 08:40

## 2017-05-15 RX ADMIN — HYDRALAZINE HYDROCHLORIDE 50 MG: 50 TABLET ORAL at 08:41

## 2017-05-15 RX ADMIN — NITROGLYCERIN 0.5 INCH: 20 OINTMENT TOPICAL at 08:48

## 2017-05-15 RX ADMIN — LEVOTHYROXINE SODIUM 100 MCG: 100 TABLET ORAL at 08:42

## 2017-05-15 RX ADMIN — CHOLECALCIFEROL TAB 25 MCG (1000 UNIT) 5000 UNITS: 25 TAB at 08:39

## 2017-05-15 RX ADMIN — DUTASTERIDE 0.5 MG: 0.5 CAPSULE, LIQUID FILLED ORAL at 09:16

## 2017-05-15 RX ADMIN — ASPIRIN 81 MG CHEWABLE TABLET 81 MG: 81 TABLET CHEWABLE at 08:41

## 2017-05-15 RX ADMIN — LEVETIRACETAM 1500 MG: 500 TABLET ORAL at 17:04

## 2017-05-15 RX ADMIN — ALLOPURINOL 150 MG: 300 TABLET ORAL at 08:44

## 2017-05-15 RX ADMIN — METOPROLOL TARTRATE 12.5 MG: 25 TABLET ORAL at 13:19

## 2017-05-15 RX ADMIN — CLOPIDOGREL BISULFATE 75 MG: 75 TABLET ORAL at 08:41

## 2017-05-15 RX ADMIN — NITROGLYCERIN 0.5 INCH: 20 OINTMENT TOPICAL at 17:05

## 2017-05-15 RX ADMIN — LEVETIRACETAM 1500 MG: 500 TABLET ORAL at 08:41

## 2017-05-15 RX ADMIN — DOCUSATE SODIUM 100 MG: 100 CAPSULE, LIQUID FILLED ORAL at 08:42

## 2017-05-15 RX ADMIN — HYDRALAZINE HYDROCHLORIDE 50 MG: 50 TABLET ORAL at 17:04

## 2017-05-15 RX ADMIN — POTASSIUM CHLORIDE 10 MEQ: 10 TABLET, EXTENDED RELEASE ORAL at 08:44

## 2017-05-15 RX ADMIN — POTASSIUM CHLORIDE 10 MEQ: 10 TABLET, EXTENDED RELEASE ORAL at 17:05

## 2017-05-15 RX ADMIN — Medication 1 TABLET: at 09:00

## 2017-05-15 RX ADMIN — BIMATOPROST 1 DROP: 0.1 SOLUTION/ DROPS OPHTHALMIC at 17:14

## 2017-05-15 NOTE — PROGRESS NOTES
[facility]      CARDIOLOGY PROGRESS NOTE  RECS:  1. Acute non-ST elevation myocardial infarction - currently chest pain free on heparin drip. 2. Hypertension- controlled. Continue current meds  3. Hyperlipidemia. 4. Diabetes, treatment per medical team.  5. Severe obesity with sleep apnea on CPAP. 6. Renal insufficiency, which may be advanced. - patient is at high risk for HD post cath-- will wait for nephrology evaluation. 7. Anemia- drop in Hb- continue to monitor. Recheck CBC. ASSESSMENT:  Hospital Problems  Never Reviewed          Codes Class Noted POA    NSTEMI (non-ST elevated myocardial infarction) [facility]) ICD-10-CM: I21.4  ICD-9-CM: 410.70  5/13/2017 Unknown                SUBJECTIVE:  No CP or SOB    OBJECTIVE:    VS:   Visit Vitals    /77 (BP 1 Location: Left arm, BP Patient Position: At rest)    Pulse 66    Temp 97.5 °F (36.4 °C)    Resp 18    Ht 5' 7\" (1.702 m)    Wt 122.6 kg (270 lb 4.8 oz)    SpO2 96%    BMI 42.33 kg/m2         Intake/Output Summary (Last 24 hours) at 05/15/17 1542  Last data filed at 05/15/17 1418   Gross per 24 hour   Intake              840 ml   Output             1450 ml   Net             -610 ml     TELE: normal sinus rhythm    General: in no apparent distress  HENT: Normocephalic, atraumatic. Normal external eye.   Neck :  JVD difficult to assess due to obesity  Cardiac:  regular rate and rhythm, S1, S2 normal, no murmur, click, rub or gallop  Lungs: clear to auscultation bilaterally  Abdomen: Soft, nontender, no masses  Extremities:  Mild edema    Labs: Results:       Chemistry Recent Labs      05/14/17   0751  05/13/17   1935   GLU  160*  254*   NA  141  139   K  3.6  3.3*   CL  104  99*   CO2  27  26   BUN  106*  112*   CREA  3.23*  3.48*   CA  8.7  9.1   AGAP  10  14   BUCR  33*  32*   AP  68  91   TP  6.9  8.3*   ALB  3.0*  3.8   GLOB  3.9  4.5*   AGRAT  0.8  0.8      CBC w/Diff Recent Labs      05/14/17   0751 05/13/17 1935   WBC  10.3  14.6*   RBC  3.37*  4.02*   HGB  8.8*  10.4*   HCT  27.1*  32.5*   PLT  195  248   GRANS  77*  86*   LYMPH  16*  9*   EOS  2  1      Cardiac Enzymes Recent Labs      05/13/17 1935   CPK  190   CKND1  2.1      Coagulation Recent Labs      05/15/17   1300  05/15/17   0505   05/14/17   0751   PTP   --    --    --   13.1   INR   --    --    --   1.0   APTT  78.4*  99.5*   < >  61.8*    < > = values in this interval not displayed. Lipid Panel Lab Results   Component Value Date/Time    Cholesterol, total 203 05/15/2017 05:05 AM    HDL Cholesterol 62 05/15/2017 05:05 AM    LDL, calculated 121.8 05/15/2017 05:05 AM    VLDL, calculated 19.2 05/15/2017 05:05 AM    Triglyceride 96 05/15/2017 05:05 AM    CHOL/HDL Ratio 3.3 05/15/2017 05:05 AM      BNP No results for input(s): BNPP in the last 72 hours.    Liver Enzymes Recent Labs      05/14/17 0751   TP  6.9   ALB  3.0*   AP  68   SGOT  24      Thyroid Studies Lab Results   Component Value Date/Time    TSH 0.38 05/14/2017 07:51 AM              Jayna Middleton MD

## 2017-05-15 NOTE — NURSE NAVIGATOR
Cardiac Nurse Navigator Initial Note       Date of  Admission: 5/13/2017  7:24 PM   Hospital Day: 2    Admission type:Emergency      Patient Active Problem List    Diagnosis Date Noted    NSTEMI (non-ST elevated myocardial infarction) ([facility] [address] 05/13/2017      PROVIDER UNKNOWN    Cardiologist: Billy Lopez     Past Medical History:   Diagnosis Date    Glaucoma     Heart disease     Hypertension     MI (myocardial infarction) ([facility] [address]     Pituitary tumor       Past Surgical History:   Procedure Laterality Date    HX CORONARY STENT PLACEMENT       Current Facility-Administered Medications   Medication Dose Route Frequency    allopurinol (ZYLOPRIM) tablet 150 mg  150 mg Oral DAILY    amLODIPine (NORVASC) tablet 5 mg  5 mg Oral DAILY    atorvastatin (LIPITOR) tablet 40 mg  40 mg Oral DAILY    cholecalciferol (VITAMIN D3) tablet 5,000 Units  5,000 Units Oral DAILY    folic acid-vit B9-VKN G63 (FOLTX) 2.5-25-2 mg tablet 1 Tab  1 Tab Oral DAILY    dutasteride (AVODART) capsule 0.5 mg  0.5 mg Oral DAILY    hydrALAZINE (APRESOLINE) tablet 50 mg  50 mg Oral BID    isosorbide mononitrate ER (IMDUR) tablet 30 mg  30 mg Oral DAILY    levETIRAcetam (KEPPRA) tablet 1,500 mg  1,500 mg Oral BID    levothyroxine (SYNTHROID) tablet 100 mcg  100 mcg Oral ACB    potassium chloride (KLOR-CON) tablet 10 mEq  10 mEq Oral BID    predniSONE (DELTASONE) tablet 5 mg  5 mg Oral DAILY    bimatoprost (LUMIGAN) 0.01 % ophthalmic drops 1 Drop  1 Drop Ophthalmic QPM    magnesium hydroxide (MILK OF MAGNESIA) 400 mg/5 mL oral suspension 30 mL  30 mL Oral DAILY PRN    docusate sodium (COLACE) capsule 100 mg  100 mg Oral BID    acetaminophen (TYLENOL) tablet 650 mg  650 mg Oral Q6H PRN    morphine injection 2 mg  2 mg IntraVENous Q4H PRN    nitroglycerin (NITROSTAT) tablet 0.4 mg  0.4 mg SubLINGual Q5MIN PRN    metoprolol tartrate (LOPRESSOR) tablet 12.5 mg  12.5 mg Oral Q6H    aspirin chewable tablet 81 mg  81 mg Oral DAILY    clopidogrel (PLAVIX) tablet 75 mg  75 mg Oral DAILY    ondansetron (ZOFRAN) injection 4 mg  4 mg IntraVENous Q4H PRN    naloxone (NARCAN) injection 0.4 mg  0.4 mg IntraVENous PRN    diphenhydrAMINE (BENADRYL) injection 12.5 mg  12.5 mg IntraVENous Q4H PRN    insulin lispro (HUMALOG) injection   SubCUTAneous AC&HS    glucose chewable tablet 16 g  16 g Oral PRN    glucagon (GLUCAGEN) injection 1 mg  1 mg IntraMUSCular PRN    dextrose (D50W) injection syrg 12.5-25 g  25-50 mL IntraVENous PRN    0.9% sodium chloride infusion  125 mL/hr IntraVENous CONTINUOUS    nitroglycerin (NITROBID) 2 % ointment 0.5 Inch  0.5 Inch Topical BID    heparin 25,000 units in D5W 250 ml infusion  8.16-25 Units/kg/hr IntraVENous TITRATE        Prior to admission patient was visiting in the area from [facility] and had chest pain    Patient observed sitting up in bed in [facility]    Cardiographics  Patient on Telemetry: Cardiac/Telemetry Monitor On: Yes   Cardiac Rhythm (only for patients on telemetry): Cardiac Rhythm: Normal sinus rhythm    Echocardiogram:  []  None ordered    [x] Results Pending       Results:     EF:           Labs:   Recent Results (from the past 24 hour(s))   URINALYSIS W/MICROSCOPIC    Collection Time: 05/14/17  7:10 PM   Result Value Ref Range    Color YELLOW      Appearance CLEAR      Specific gravity 1.016 1.005 - 1.030      pH (UA) 5.0 5.0 - 8.0      Protein NEGATIVE  NEG mg/dL    Glucose NEGATIVE  NEG mg/dL    Ketone NEGATIVE  NEG mg/dL    Bilirubin NEGATIVE  NEG      Blood NEGATIVE  NEG      Urobilinogen 0.2 0.2 - 1.0 EU/dL    Nitrites NEGATIVE  NEG      Leukocyte Esterase NEGATIVE  NEG      WBC 0 to 1 0 - 4 /hpf    RBC NONE 0 - 5 /hpf    Epithelial cells FEW 0 - 5 /lpf    Bacteria NEGATIVE  NEG /hpf   GLUCOSE, POC    Collection Time: 05/14/17  9:31 PM   Result Value Ref Range    Glucose (POC) 166 (H) 70 - 110 mg/dL   PTT    Collection Time: 05/14/17 10:03 PM   Result Value Ref Range    aPTT 73.7 (H) 23.0 - 36.4 SEC   PTT Collection Time: 05/15/17  5:05 AM   Result Value Ref Range    aPTT 99.5 (H) 23.0 - 36.4 SEC   LIPID PANEL    Collection Time: 05/15/17  5:05 AM   Result Value Ref Range    LIPID PROFILE          Cholesterol, total 203 (H) <200 MG/DL    Triglyceride 96 <150 MG/DL    HDL Cholesterol 62 (H) 40 - 60 MG/DL    LDL, calculated 121.8 (H) 0 - 100 MG/DL    VLDL, calculated 19.2 MG/DL    CHOL/HDL Ratio 3.3 0 - 5.0     HEMOGLOBIN A1C WITH EAG    Collection Time: 05/15/17  5:05 AM   Result Value Ref Range    Hemoglobin A1c 8.6 (H) 4.2 - 5.6 %    Est. average glucose 200 mg/dL   GLUCOSE, POC    Collection Time: 05/15/17  7:18 AM   Result Value Ref Range    Glucose (POC) 116 (H) 70 - 110 mg/dL   PTT    Collection Time: 05/15/17  1:00 PM   Result Value Ref Range    aPTT 78.4 (H) 23.0 - 36.4 SEC   GLUCOSE, POC    Collection Time: 05/15/17  1:17 PM   Result Value Ref Range    Glucose (POC) 158 (H) 70 - 110 mg/dL     Lab Results   Component Value Date/Time    Hemoglobin A1c 8.6 05/15/2017 05:05 AM       Code Status: Full Code    Patient would benefit from:  [] Cardiac Rehab             [x]Home Health   [] PT  [] Case Management             [] H2H                           [] OT                                    [] Nutrition                              []  Apnea Link                [] 6 minute walk                            []  Outpatient sleep study  [] Other:         [] Palliative Care n/a     Education reinforced, patient and/or family given opportunity to ask questions. Education done. Encouraged pt. To do some exercise to try and lose weight. Discussed fluids and pt. Said his nephrologist wants him to drink fluids and is confirmed with wife. So told him we will see how this nephrologist treats him. WIll follow.       Melody Samie Cooks, RN

## 2017-05-15 NOTE — DIABETES MGMT
NUTRITIONAL ASSESSMENT GLYCEMIC CONTROL/ PLAN OF CARE     Sarabjit.           66 y.o.           5/13/2017                 1. NSTEMI (non-ST elevated myocardial infarction) [facility])       INTERVENTIONS/PLAN:   Continue correctional Lispro insulin coverage  Diabetes education   ASSESSMENT:   Pt is a 66year old male with a past medical history significant for coronary artery disease, hypertension, and diabetes. Blood glucose fluctuating. Pt is receiving correctional Lispro insulin coverage. Pt reports having a good appetite, ate most of lunch meal. Pt states he has been trying to cut back on portions at home. Checks blood glucose regularly at home, encouraged patient to occassionally monitor glucose two hours after a meal. Pt provided with written educational materials as well. Continue inpatient monitoring and intervention.      Diabetes Management:   Recent blood glucose:     5/14/2017 15:48 5/14/2017 21:31 5/15/2017 07:18 5/15/2017 13:17   184 (H) 166 (H) 116 (H) 158 (H)   Within target range: [x] Yes   []  No    Current Insulin regimen:   Correctional Lispro insulin 4 times daily ACHS (normal insulin sensitivity)  Home medication/insulin regimen:   Glimepiride   HbA1c: 8.6% (estimated average glucose of 200 mg/dL)  Adequate glycemic control PTA:  [] Yes  [x] No      SUBJECTIVE/OBJECTIVE:   Information obtained from: patient, patients wife, chart review    Diet: Diabetic consistent carbohydrate 5160-4370 kcal, no concentrated sweets    Patient Vitals for the past 100 hrs:   % Diet Eaten   05/14/17 1756 100 %     Medications: [x]   Reviewed     Most Recent POC Glucose: Recent Labs      05/14/17   0751  05/13/17   1935   GLU  160*  254*      Labs:   Lab Results   Component Value Date/Time    Hemoglobin A1c 8.6 05/15/2017 05:05 AM     Lab Results   Component Value Date/Time    Sodium 141 05/14/2017 07:51 AM    Potassium 3.6 05/14/2017 07:51 AM    Chloride 104 05/14/2017 07:51 AM    CO2 27 05/14/2017 07:51 AM Anion gap 10 05/14/2017 07:51 AM    Glucose 160 05/14/2017 07:51 AM     05/14/2017 07:51 AM    Creatinine 3.23 05/14/2017 07:51 AM    Calcium 8.7 05/14/2017 07:51 AM    Albumin 3.0 05/14/2017 07:51 AM     Anthropometrics:   BMI (calculated): 42.3  Wt Readings from Last 1 Encounters:   05/15/17 122.6 kg (270 lb 4.8 oz)    Ht Readings from Last 1 Encounters:   05/13/17 5' 7\" (1.702 m)     Estimated Nutrition Needs:  6124-9858 Kcal/day (500 kcal deficit for weight loss),  grams protein/day  Based on:   [x]Actual BW    [] IBW   []  Adjusted BW      Nutrition Diagnoses:    Altered nutrition related lab value related to diabetes as evidenced by Hemoglobin A1c of 8.6%  Nutrition Interventions: diabetes education, coordination of care   Goal: Blood glucose will be within target range of  mg/dL by 5/18/17     Nutrition Monitoring and Evaluation    []     Monitor po intake on meal rounds  [x]     Continue inpatient monitoring and intervention  []     Other:    Clearance JOSÉ Rowe, CDE

## 2017-05-15 NOTE — PROGRESS NOTES
met with patient's wife, completed the initial Spiritual Assessment of the patient, and offered Pastoral Care, see flow sheets for interventions. Patient was out of room at a medical test. Pastoral support provided. Wife said they are visiting from [facility]. Wife is hopeful they can return soon. They are members of a lala group and have good support from their [employer]. Patient does not have any Anabaptism/cultural needs that will affect patients preferences in health care. Chart reviewed. Chaplains will continue to follow and will provide pastoral care on an as needed/as requested basis. Dimitri Cardenas MDiv.   Board Certified [employer] [phone]

## 2017-05-15 NOTE — ROUTINE PROCESS
Bedside and Verbal shift change report given to Mike Banda (oncoming nurse) by Marck Perkins RN (offgoing nurse).  Report included the following information SBAR, Kardex, Intake/Output, MAR, Recent Results and Cardiac Rhythm SR.

## 2017-05-15 NOTE — ROUTINE PROCESS
Bedside and Verbal shift change report given to Margaret Tucker RN (oncoming nurse) by Manju Majano RN (offgoing nurse). Report included the following information SBAR, Kardex, MAR and Recent Results.

## 2017-05-15 NOTE — PROGRESS NOTES
Stage 4 CRF. Hypertension. Type 2 DM. [place] Halt Anemia. Suspect Secondary Hyper parathyroidsm. NSTEMI. Component of CHF/Fluid overload. At very high Risk of Contrast Nephropathy/ Pulmonary Edema  & worsening Renal failiure. Plan : Await cardiac workup, recommend medical management as much as possibe, he is a very high risk patient  For worsening Renal function & Pulmonary edema following contrast.. Will follow.

## 2017-05-15 NOTE — PROGRESS NOTES
Bedside and Verbal shift change report given to JOSIANE Salas  (oncoming nurse) by Loretta Chapin (offgoing nurse). Report included the following information SBAR, Kardex, MAR and Recent Results.         Loretta Chapin

## 2017-05-15 NOTE — CONSULTS
[address]    Name:  Amalia Renee  MR#:  [id number]  :  1945  Account #:  [de-identified]  Date of Adm:  2017  Date of Consultation:      REASON FOR CONSULTATION: Abnormal renal function, consult  was requested by Dr. Lennard Burkitt, hospitalist.    HISTORY OF PRESENT ILLNESS: This is 66-year-old [facility]  American male whom I am seeing for the first time on consultation at  the request of Dr. Lennard Burkitt. This patient is coming from [place], [place]. He is visiting here and went to a party 2 days back on Mother's Day  and on the way back to his hotel, he was feeling nauseous and  stopped at [facility] and wanted to go back to his hotel. He started  afterwards heaviness in his chest together with mild vomiting and his  wife, who was taking him to the hotel, brought him directly to the  emergency room. After evaluation in the emergency room, we found  that the patient has cardiac enzymes which were abnormal and being  admitted for further workup. Discomfort was mostly in the chest,  heaviness without any radiation, but he did feel some shortness of  breath along with nausea and mild vomiting. Currently, he is not having  any pain. Started on heparin drip as the cardiac enzymes were  abnormal and since then enzymes show upward trend, confirming a  non-ST elevated MI. EKG did not show any ST segment elevation, but  nonspecific ST wave changes. By  clinically looking, he seems also in heart failure, possibly [place]  Heart Association class III heart failure. His medical history is significant for hypertension, type 2 diabetes  mellitus, stage IV chronic renal failure for which he follows with a  nephrologist in [facility]; cannot recall exactly what his last creatinine  was. He has known coronary artery disease and a cardiac stent was  placed a few years back in  by Dr. Jose Jalloh in [place]. The patient denies taking any NSAIDs.  The patient was  on Losartan for his possibly diabetic nephropathy and proteinuria, but  that has been discontinued by Cardiology, given his rise of serum  creatinine. As per the wife, the patient drinks significant amount of  water. The patient is not sure whether he is taking any Lasix or not. By  talking with him, they know that they have stage IV chronic renal  failure. The patient notes that he has stage IV chronic renal failure, but  nothing been discussed with him about the future care whether he will  need any dialysis or not. He does not know his cardiac   ejection fraction. . Currently, he does not have any  problem with the prostate and does not have any issue of passing  urine. Denies any hematuria, flank pain. PAST MEDICAL HISTORY: Significant for coronary artery disease with  a stent in 2007 by Dr. Nestor Chu, hypertension, hyperlipidemia, obesity, type  2 diabetes mellitus, possible sleep apnea, and a stage IV chronic renal  failure. He is not sure whether he is anemic or not, but he has a  hemoglobin on the lower side. REVIEW OF SYSTEMS  GENERAL: Well-built, well-nourished, obese person without any acute  distress, currently no evidence of any chest pain or heaviness in the  chest.  CARDIOVASCULAR: Initial admission was heaviness in the chest,  now no heaviness in the chest. Also was short of breath at the time, no  shortness of breath now. No history of any palpitations, but has some  leg swelling. RESPIRATORY SYSTEM: Mild shortness of breath without any  wheezing or any coughing or any hemoptysis. GASTROINTESTINAL: Mild nausea, no vomiting at the time of  admission, which has resolved. No constipation. No abdominal pain. GENITOURINARY: No urgency, no hesitancy, no hematuria. No flank  pain. MUSCULOSKELETAL:  mild tiredness without any fatigue or any  muscle related problem, no muscle pain. ALLERGIES: NO KNOWN DRUG ALLERGIES. MEDICATIONS IN HOSPITAL:  1. IV heparin. 2. Aspirin. 3. Norvasc. 4. Allopurinol. 5. Lipitor.   6. Eyedrops for glaucoma. 7. Vitamin. 8. Plavix. 9. Avodart. 10. Colace. 11. Folic acid. 12. Imdur. 13. Keppra. 14. Metoprolol. 15. Nitro paste. 16. Prednisone 5 mg p.o. daily. 17. I am not sure whether he is on prednisone and the potassium  replacement. HOME MEDICATIONS ALSO INCLUDED:  1. Oral hypoglycemic  that was started a couple of months ago, but the patient was not  on any metformin. 2. Keppra 500 mg 2 times daily. 3. Synthroid 100 mcg p.o. daily. 4. Losartan 50 mg p.o. daily. 5. Potassium chloride 20 mEq p.o. daily. 6.  eye drop applied to the eye. 7. Avodart. 8. Bumex 1 mg tablet daily. PHYSICAL EXAMINATION  GENERAL: The patient is alert, awake, and oriented, not in acute  distress. VITAL SIGNS: Temperature of 97.5, heart rate 66, blood pressure  151/77, respiration 18, and oxygen saturation of 96% at room air. Weight on admission was 118.5 kg, now has increased up to 122.6 kg. His Bumex is on hold. In and out record shows urine output of 1550  in the last 24 hours. HEENT: Oral mucosa moist.  NECK: Jugular veins are slightly distended. LUNGS: Good air entry in both sides without any rales or rhonchi in the  chest.  HEART: S1, S2, without any gallop or murmur. ABDOMEN: Obese, could not appreciate any organomegaly. EXTREMITIES: Ankle 1-2+ edema. RELEVANT LABS: As of today. The last CBC was done on 14th of this  month, which shows WBC of 10.3 with a hemoglobin of 28.8,  hematocrit of 27.1, platelets of 956,739. MCV of 80.4, MCHC is 32.5  and MCH 26.1. RDW of 15.9, platelet of 806,399. The last chemistry  that we have on 14th, sodium 141, potassium 3.6, chloride 104, CO2  27, glucose of 160, blood urea nitrogen of 106, creatinine of 3.23. Before that, blood urea nitrogen of 112, creatinine of 3.48, calcium 8.7. Total protein of 6.9 with albumin of 3.0. On admission, it was 8.3 and  albumin of 3.8. EGFR is around 21-26/minute and lipase 470,  triglycerides 96.  Total cholesterol 203, HDL of 62, LDL of 121.8. CPK  total was from 119 with index of 2.1, MB fraction of 3.9, troponin was  0.09. Subsequently, her troponin was checked, increased up to 3.30,  then came down to 2.83. Hemoglobin A1c was 8.6 and a blood  glucose is more than 200 mg per deciliter. Portable chest x-ray was  done on the 13th of this month, no acute infiltrate or effusion,  cardiomegaly. A ventilation and perfusion scan was done, low  probability ventilation perfusion scan. Endocrine, TSH of 0.38. PTH  has been ordered. Urinalysis specific gravity 1.016, pH of 5, protein  negative, glucose negative, ketone negative, urobilinogen 0.2, nitrite  negative, bacteria negative. WBC 0-1. No renal ultrasound being done. IMPRESSION:  1. The patient has stage IV chronic renal failure. 2. Anemia. 3. Possible Secondary hyperparathyroidism. 4. Possible diabetic nephropathy. 5. Hypertension. 6. Non-ST-elevation acute myocardial infarction. 7. Hyperlipidemia. 8. Obstructive sleep apnea. PLAN: The patient is a very high risk patient for contrast nephropathy. His BUN and creatinine is in the prerenal ratio with a blood urea of  more than 100 anemic. We need to also check the stool for  Hemoccult just to make sure that he is not passing any blood in the  stool, but given his risk factors including his age, known kidney  disease, hypertension, and diabetes, he is at very high risk from  complication from contrast that can cause contrast nephropathy as well  as worsening of the renal failure and may end up with dialysis. For his  MI, cardiologist is working on his MI.  Probably best way to handle the  situation is to avoid cardiac cath until & unless  absolutely needed & manage this  problem with medical management as long as possible, and then  stabilize him and let him go to his regular place to address this  problem, but if he needs definitive cardiac catheter, then definitely  have to take the risk of damaging the kidney and therefore contrast  nephropathy. It will be difficult to hydrate him pre and post  Cath given  the volume status that he has with essential  volume overload with  ankle  edema , cardiomegaly  & pulmonary congestion by chest x-ray. In the  meanwhile, I am going to do the anemia workup to check for  Hemoccult stool and check for the urine protein creatinine ratio. Further recommendations will be given based on the hospital course. Currently, the patient is on hold Bumex, but if the swelling does not  improve, then we may need to start some diuretics also. Further  recommendations will be given based on the hospital course.         Ramses Ignacio MD    [address] / Zina Pickett  D:  05/15/2017   13:44  T:  05/15/2017   14:46  Job #:  [id number]

## 2017-05-15 NOTE — PROGRESS NOTES
Hospitalist Progress Note    Patient: Julius Benz. MRN: [id number]  CSN: [id number]    YOB: 1945  Age: 66 y.o. Sex: male    DOA: 5/13/2017 LOS:  LOS: 2 days           echo done, report pending. Hb 8.8, down from 10.4. Patient denies chest pain, states he walked down the negron w/o chest pain or dyspnea. Assessment/Plan     1. NSTEMI - medical management and cards to consider cath pending nephrology input. 2. Hypertension - diuretics held, continue current regimen, cards following. 3. Hyperlipidemia. On home med Lipitor. Follow flp. 4. DM2 - ssi, A1c 8.6. Diabetes educator worked with him. 5. oksana - home cpap (ordered). 6. Elevated creatinine - unknown baseline - no records available electronically in cc - nephrology following. 7. Leukocytosis improved - likely reactive in the setting of MI.  8. Anemia nc nc - check cbc, stool for occult blood, and studies. 9. Elevated D-dimer: CTA chest not done secondary to his renal failure. on full-dose heparin. VQ: Low probability ventilation perfusion scan. 10. Morbid obesity Body mass index is 42.33 kg/(m^2). 11. Full code . Stepdown. Additional Notes:      Case discussed with:  [x]Patient  [x]Family  [x]Nursing  []Case Management  DVT Prophylaxis:  []Lovenox  []Hep SQ  []SCDs  []Coumadin   [x]On Heparin gtt    Vital signs/Intake and Output:  Visit Vitals    /77 (BP 1 Location: Left arm, BP Patient Position: At rest)    Pulse 66    Temp 97.5 °F (36.4 °C)    Resp 18    Ht 5' 7\" (1.702 m)    Wt 122.6 kg (270 lb 4.8 oz)    SpO2 96%    BMI 42.33 kg/m2     Current Shift:  05/15 0701 - 05/15 1900  In: -   Out: 200 [Urine:200]  Last three shifts:  05/13 1901 - 05/15 0700  In: 321.3 [P.O.:240; I.V.:81.3]  Out: 1770 [Urine:1770]    Awake alert and oriented. Obese. Family members at bedside including wife.    Ncat. perrl  RRR  cta b.l  Obese soft nt nd nabs  No edema  No focal deficit  No rash    Medications Reviewed      Labs: Results:       Chemistry Recent Labs      05/14/17 0751 05/13/17 1935   GLU  160*  254*   NA  141  139   K  3.6  3.3*   CL  104  99*   CO2  27  26   BUN  106*  112*   CREA  3.23*  3.48*   CA  8.7  9.1   AGAP  10  14   BUCR  33*  32*   AP  68  91   TP  6.9  8.3*   ALB  3.0*  3.8   GLOB  3.9  4.5*   AGRAT  0.8  0.8      CBC w/Diff Recent Labs      05/14/17 0751 05/13/17 1935   WBC  10.3  14.6*   RBC  3.37*  4.02*   HGB  8.8*  10.4*   HCT  27.1*  32.5*   PLT  195  248   GRANS  77*  86*   LYMPH  16*  9*   EOS  2  1      Cardiac Enzymes Recent Labs      05/13/17 1935   CPK  190   CKND1  2.1      Coagulation Recent Labs      05/15/17   1300  05/15/17   0505   05/14/17 0751   PTP   --    --    --   13.1   INR   --    --    --   1.0   APTT  78.4*  99.5*   < >  61.8*    < > = values in this interval not displayed. Lipid Panel Lab Results   Component Value Date/Time    Cholesterol, total 203 05/15/2017 05:05 AM    HDL Cholesterol 62 05/15/2017 05:05 AM    LDL, calculated 121.8 05/15/2017 05:05 AM    VLDL, calculated 19.2 05/15/2017 05:05 AM    Triglyceride 96 05/15/2017 05:05 AM    CHOL/HDL Ratio 3.3 05/15/2017 05:05 AM      BNP No results for input(s): BNPP in the last 72 hours. Liver Enzymes Recent Labs      05/14/17 0751   TP  6.9   ALB  3.0*   AP  68   SGOT  24      Thyroid Studies Lab Results   Component Value Date/Time    TSH 0.38 05/14/2017 07:51 AM        Procedures/imaging: see electronic medical records for all procedures/Xrays and details which were not copied into this note but were reviewed prior to creation of Plan.

## 2017-05-15 NOTE — DIABETES MGMT
Diabetes Patient/Family Education Record    Factors That  May Influence Patients Ability  to Learn or  Comply With  Recommendations:    []   Language barrier    []   Cultural needs   []   Motivation    []   Cognitive limitation    []   Physical   []   Education    []   Physiological factors   []   Hearing/vision/speaking impairment   []   Jewish beliefs    []   Financial factors   []  Other:   [x]  No factors identified at this time.      Person Instructed:   [x]   Patient   [x]   Family   []  Other     Preference for Learning:   [x]   Verbal   [x]   Written   []  Demonstration     Level of Comprehension & Competence:    []  Good                                      [x] Fair                                     []  Poor                             []  Needs Reinforcement   [x]  Teachback completed    Education Component:   [x]  Medication management, including how to administer insulin (if appropriate) and potential medication interactions    [x]  Nutritional management including the role of carbohydrate intake   []  Exercise   []  Signs, symptoms, and treatment of hyperglycemia and hypoglycemia   [] Treatment of hyperglycemia and hypoglycemia   [x]  Importance of blood glucose monitoring and how to obtain a blood glucose meter    []  Instruction on use of blood glucose meter   [x]  Discuss the importance of HbA1C monitoring and provide patient with  results   []  Sick day guidelines   []  Proper use and disposal of lancets, needles, syringes or insulin pens (if appropriate)   [x]  Potential long-term complications (retinopathy, kidney disease, neuropathy, heart disease, stroke, vascular disease, foot care)   [x] Provide emergency contact number and contact number for more information    [x]  Goal:  Patient/family will demonstrate understanding of Diabetes Self Management Skills by: 5/22/17  Plan for post-discharge education or self-management support:    [] Outpatient class schedule provided            [x] Patient Declined (from Nicolas)    [] Scheduled for outpatient classes (date) _______       Wood Dias RD, CDE

## 2017-05-15 NOTE — ROUTINE PROCESS
0730 Bedside report given to me by Paulie Agrawal for off tele orders. Patient going to nuclear med. 0830 Assessment completed. Patient A&O x4. No s/s of pain / discomfort. 1025 off the floor to nuclear med. Tele box off.  1320 back on the floor from nuclear [facility]. Assessment complete. Patient sharla any s/s of pain. Tele box is on. Wife @ bedside, CNA @ bedside .

## 2017-05-16 PROBLEM — N18.30 CHRONIC KIDNEY DISEASE, STAGE III (MODERATE) (HCC): Status: ACTIVE | Noted: 2017-05-16

## 2017-05-16 PROBLEM — I10 HYPERTENSION: Status: ACTIVE | Noted: 2017-05-16

## 2017-05-16 PROBLEM — D64.9 ANEMIA: Status: ACTIVE | Noted: 2017-05-16

## 2017-05-16 LAB
ALBUMIN SERPL BCP-MCNC: 2.5 G/DL (ref 3.4–5)
ALBUMIN/GLOB SERPL: 0.7 {RATIO} (ref 0.8–1.7)
ALP SERPL-CCNC: 62 U/L (ref 45–117)
ALT SERPL-CCNC: 18 U/L (ref 16–61)
ANION GAP BLD CALC-SCNC: 8 MMOL/L (ref 3–18)
APTT PPP: 113.1 SEC (ref 23–36.4)
APTT PPP: 80.1 SEC (ref 23–36.4)
AST SERPL W P-5'-P-CCNC: 19 U/L (ref 15–37)
BASOPHILS # BLD AUTO: 0 K/UL (ref 0–0.1)
BASOPHILS # BLD: 0 % (ref 0–2)
BILIRUB SERPL-MCNC: 0.3 MG/DL (ref 0.2–1)
BUN SERPL-MCNC: 76 MG/DL (ref 7–18)
BUN/CREAT SERPL: 30 (ref 12–20)
CALCIUM SERPL-MCNC: 8.6 MG/DL (ref 8.5–10.1)
CALCIUM SERPL-MCNC: 8.9 MG/DL (ref 8.5–10.1)
CHLORIDE SERPL-SCNC: 111 MMOL/L (ref 100–108)
CO2 SERPL-SCNC: 25 MMOL/L (ref 21–32)
CREAT SERPL-MCNC: 2.52 MG/DL (ref 0.6–1.3)
DIFFERENTIAL METHOD BLD: ABNORMAL
EOSINOPHIL # BLD: 0.2 K/UL (ref 0–0.4)
EOSINOPHIL NFR BLD: 2 % (ref 0–5)
ERYTHROCYTE [DISTWIDTH] IN BLOOD BY AUTOMATED COUNT: 15.7 % (ref 11.6–14.5)
FERRITIN SERPL-MCNC: 76 NG/ML (ref 8–388)
FOLATE SERPL-MCNC: >20 NG/ML (ref 3.1–17.5)
GLOBULIN SER CALC-MCNC: 3.6 G/DL (ref 2–4)
GLUCOSE BLD STRIP.AUTO-MCNC: 105 MG/DL (ref 70–110)
GLUCOSE BLD STRIP.AUTO-MCNC: 125 MG/DL (ref 70–110)
GLUCOSE BLD STRIP.AUTO-MCNC: 136 MG/DL (ref 70–110)
GLUCOSE BLD STRIP.AUTO-MCNC: 162 MG/DL (ref 70–110)
GLUCOSE SERPL-MCNC: 96 MG/DL (ref 74–99)
HCT VFR BLD AUTO: 26.1 % (ref 36–48)
HGB BLD-MCNC: 8.5 G/DL (ref 13–16)
IRON SATN MFR SERPL: 23 %
IRON SERPL-MCNC: 48 UG/DL (ref 50–175)
LYMPHOCYTES # BLD AUTO: 23 % (ref 21–52)
LYMPHOCYTES # BLD: 2.2 K/UL (ref 0.9–3.6)
MCH RBC QN AUTO: 26.2 PG (ref 24–34)
MCHC RBC AUTO-ENTMCNC: 32.6 G/DL (ref 31–37)
MCV RBC AUTO: 80.6 FL (ref 74–97)
MONOCYTES # BLD: 0.4 K/UL (ref 0.05–1.2)
MONOCYTES NFR BLD AUTO: 4 % (ref 3–10)
NEUTS SEG # BLD: 6.6 K/UL (ref 1.8–8)
NEUTS SEG NFR BLD AUTO: 71 % (ref 40–73)
PHOSPHATE SERPL-MCNC: 3 MG/DL (ref 2.5–4.9)
PLATELET # BLD AUTO: 215 K/UL (ref 135–420)
PMV BLD AUTO: 10.8 FL (ref 9.2–11.8)
POTASSIUM SERPL-SCNC: 4.1 MMOL/L (ref 3.5–5.5)
PROT SERPL-MCNC: 6.1 G/DL (ref 6.4–8.2)
PTH-INTACT SERPL-MCNC: 141.6 PG/ML (ref 14–72)
RBC # BLD AUTO: 3.24 M/UL (ref 4.7–5.5)
SODIUM SERPL-SCNC: 144 MMOL/L (ref 136–145)
TIBC SERPL-MCNC: 211 UG/DL (ref 250–450)
VIT B12 SERPL-MCNC: >2000 PG/ML (ref 211–911)
WBC # BLD AUTO: 9.5 K/UL (ref 4.6–13.2)

## 2017-05-16 PROCEDURE — 85730 THROMBOPLASTIN TIME PARTIAL: CPT | Performed by: HOSPITALIST

## 2017-05-16 PROCEDURE — 36415 COLL VENOUS BLD VENIPUNCTURE: CPT | Performed by: HOSPITALIST

## 2017-05-16 PROCEDURE — 74011636637 HC RX REV CODE- 636/637: Performed by: HOSPITALIST

## 2017-05-16 PROCEDURE — 82962 GLUCOSE BLOOD TEST: CPT

## 2017-05-16 PROCEDURE — 82728 ASSAY OF FERRITIN: CPT | Performed by: INTERNAL MEDICINE

## 2017-05-16 PROCEDURE — 83970 ASSAY OF PARATHORMONE: CPT | Performed by: INTERNAL MEDICINE

## 2017-05-16 PROCEDURE — 85025 COMPLETE CBC W/AUTO DIFF WBC: CPT | Performed by: HOSPITALIST

## 2017-05-16 PROCEDURE — 83540 ASSAY OF IRON: CPT | Performed by: INTERNAL MEDICINE

## 2017-05-16 PROCEDURE — 84100 ASSAY OF PHOSPHORUS: CPT | Performed by: INTERNAL MEDICINE

## 2017-05-16 PROCEDURE — 74011250637 HC RX REV CODE- 250/637: Performed by: EMERGENCY MEDICINE

## 2017-05-16 PROCEDURE — 80053 COMPREHEN METABOLIC PANEL: CPT | Performed by: INTERNAL MEDICINE

## 2017-05-16 PROCEDURE — 74011250637 HC RX REV CODE- 250/637: Performed by: HOSPITALIST

## 2017-05-16 PROCEDURE — 82607 VITAMIN B-12: CPT | Performed by: INTERNAL MEDICINE

## 2017-05-16 PROCEDURE — 65660000004 HC RM CVT STEPDOWN

## 2017-05-16 PROCEDURE — 74011250637 HC RX REV CODE- 250/637: Performed by: INTERNAL MEDICINE

## 2017-05-16 RX ORDER — HYDRALAZINE HYDROCHLORIDE 50 MG/1
50 TABLET, FILM COATED ORAL 3 TIMES DAILY
Status: DISCONTINUED | OUTPATIENT
Start: 2017-05-16 | End: 2017-05-20 | Stop reason: HOSPADM

## 2017-05-16 RX ORDER — METOPROLOL TARTRATE 25 MG/1
12.5 TABLET, FILM COATED ORAL 2 TIMES DAILY
Status: DISCONTINUED | OUTPATIENT
Start: 2017-05-16 | End: 2017-05-20 | Stop reason: HOSPADM

## 2017-05-16 RX ORDER — AMLODIPINE BESYLATE 5 MG/1
5 TABLET ORAL ONCE
Status: COMPLETED | OUTPATIENT
Start: 2017-05-16 | End: 2017-05-16

## 2017-05-16 RX ORDER — AMLODIPINE BESYLATE 10 MG/1
10 TABLET ORAL DAILY
Status: DISCONTINUED | OUTPATIENT
Start: 2017-05-17 | End: 2017-05-20 | Stop reason: HOSPADM

## 2017-05-16 RX ADMIN — DOCUSATE SODIUM 100 MG: 100 CAPSULE, LIQUID FILLED ORAL at 17:47

## 2017-05-16 RX ADMIN — LEVETIRACETAM 1500 MG: 500 TABLET ORAL at 09:03

## 2017-05-16 RX ADMIN — LEVOTHYROXINE SODIUM 100 MCG: 100 TABLET ORAL at 09:09

## 2017-05-16 RX ADMIN — NITROGLYCERIN 0.5 INCH: 20 OINTMENT TOPICAL at 18:00

## 2017-05-16 RX ADMIN — CLOPIDOGREL BISULFATE 75 MG: 75 TABLET ORAL at 09:03

## 2017-05-16 RX ADMIN — ALLOPURINOL 150 MG: 300 TABLET ORAL at 09:04

## 2017-05-16 RX ADMIN — LEVETIRACETAM 1500 MG: 500 TABLET ORAL at 17:46

## 2017-05-16 RX ADMIN — INSULIN LISPRO 2 UNITS: 100 INJECTION, SOLUTION INTRAVENOUS; SUBCUTANEOUS at 17:53

## 2017-05-16 RX ADMIN — ASPIRIN 81 MG CHEWABLE TABLET 81 MG: 81 TABLET CHEWABLE at 09:02

## 2017-05-16 RX ADMIN — DOCUSATE SODIUM 100 MG: 100 CAPSULE, LIQUID FILLED ORAL at 09:03

## 2017-05-16 RX ADMIN — CHOLECALCIFEROL TAB 25 MCG (1000 UNIT) 5000 UNITS: 25 TAB at 09:02

## 2017-05-16 RX ADMIN — AMLODIPINE BESYLATE 5 MG: 5 TABLET ORAL at 09:05

## 2017-05-16 RX ADMIN — Medication 1 TABLET: at 09:00

## 2017-05-16 RX ADMIN — HYDRALAZINE HYDROCHLORIDE 50 MG: 50 TABLET ORAL at 22:00

## 2017-05-16 RX ADMIN — POTASSIUM CHLORIDE 10 MEQ: 10 TABLET, EXTENDED RELEASE ORAL at 17:52

## 2017-05-16 RX ADMIN — BIMATOPROST 1 DROP: 0.1 SOLUTION/ DROPS OPHTHALMIC at 17:57

## 2017-05-16 RX ADMIN — AMLODIPINE BESYLATE 5 MG: 5 TABLET ORAL at 17:52

## 2017-05-16 RX ADMIN — POTASSIUM CHLORIDE 10 MEQ: 10 TABLET, EXTENDED RELEASE ORAL at 09:10

## 2017-05-16 RX ADMIN — NITROGLYCERIN 0.5 INCH: 20 OINTMENT TOPICAL at 09:11

## 2017-05-16 RX ADMIN — ATORVASTATIN CALCIUM 40 MG: 40 TABLET, FILM COATED ORAL at 09:09

## 2017-05-16 RX ADMIN — METOPROLOL TARTRATE 12.5 MG: 25 TABLET ORAL at 17:58

## 2017-05-16 RX ADMIN — HYDRALAZINE HYDROCHLORIDE 50 MG: 50 TABLET ORAL at 09:04

## 2017-05-16 RX ADMIN — DUTASTERIDE 0.5 MG: 0.5 CAPSULE, LIQUID FILLED ORAL at 09:07

## 2017-05-16 RX ADMIN — PREDNISONE 5 MG: 10 TABLET ORAL at 09:05

## 2017-05-16 RX ADMIN — ISOSORBIDE MONONITRATE 30 MG: 30 TABLET, EXTENDED RELEASE ORAL at 09:09

## 2017-05-16 NOTE — ROUTINE PROCESS
Patient declined to be washed up and sheets changed, stated his wife will help him when she comes in this morning.  0017    Kj Li RN

## 2017-05-16 NOTE — PROGRESS NOTES
Hospitalist Progress Note    Patient: Radha Sawyer MRN: [id number]  CSN: [id number]    YOB: 1945  Age: 66 y.o. Sex: male    DOA: 5/13/2017 LOS:  LOS: 3 days          Stool + occult blood. Creatinine down to 2.52. Denies chest pain or mcconnell. Last colo was several years ago. Assessment/Plan     1. NSTEMI - medical management and cards to consider stress test.  2. Hypertension - diuretics held, continue current regimen, cards following. 3. dyslipidemia. On Lipitor. 4. DM2 - ssi, A1c 8.6. Diabetes educator worked with him. 5. oksana - home cpap (ordered). 6. Elevated creatinine - unknown baseline - no records available electronically in cc - nephrology following. 7. Leukocytosis improved - likely reactive in the setting of MI.  8. Stool + for occult blood - GI to consult. 9. Elevated D-dimer: CTA chest not done secondary to his renal failure. on full-dose heparin. VQ: Low probability ventilation perfusion scan. 10. Morbid obesity Body mass index is 42.46 kg/(m^2). 11. Full code . Stepdown. I discussed the case with Dr. Joshua Deluca. Additional Notes:      Case discussed with:  [x]Patient  [x]Family  [x]Nursing  []Case Management  DVT Prophylaxis:  []Lovenox  []Hep SQ  []SCDs  []Coumadin   [x]On Heparin gtt    Vital signs/Intake and Output:  Visit Vitals    /69 (BP 1 Location: Right arm, BP Patient Position: At rest)    Pulse 66    Temp 97.8 °F (36.6 °C)    Resp 20    Ht 5' 7\" (1.702 m)    Wt 123 kg (271 lb 1.6 oz)    SpO2 96%    BMI 42.46 kg/m2     Current Shift:  05/16 0701 - 05/16 1900  In: 480 [P.O.:480]  Out: 700 [Urine:700]  Last three shifts:  05/14 1901 - 05/16 0700  In: 840 [P.O.:840]  Out: 1525 [Urine:1525]    Awake alert and oriented. Obese. Sitting up in chair. Wife at bedside.    Ncat. perrl  RRR  cta b.l  Obese soft nt nd nabs  No edema  No focal deficit  No rash    Medications Reviewed      Labs: Results:       Chemistry Recent Labs      05/16/17 6586  05/14/17   0751 05/13/17 1935   GLU  96  160*  254*   NA  144  141  139   K  4.1  3.6  3.3*   CL  111*  104  99*   CO2  25  27  26   BUN  76*  106*  112*   CREA  2.52*  3.23*  3.48*   CA  8.9  8.6  8.7  9.1   AGAP  8  10  14   BUCR  30*  33*  32*   AP  62  68  91   TP  6.1*  6.9  8.3*   ALB  2.5*  3.0*  3.8   GLOB  3.6  3.9  4.5*   AGRAT  0.7*  0.8  0.8      CBC w/Diff Recent Labs      05/16/17   0504  05/14/17   0751  05/13/17 1935   WBC  9.5  10.3  14.6*   RBC  3.24*  3.37*  4.02*   HGB  8.5*  8.8*  10.4*   HCT  26.1*  27.1*  32.5*   PLT  215  195  248   GRANS  71  77*  86*   LYMPH  23  16*  9*   EOS  2  2  1      Cardiac Enzymes Recent Labs      05/13/17 1935   CPK  190   CKND1  2.1      Coagulation Recent Labs      05/16/17   1247  05/16/17   0504   05/14/17 0751   PTP   --    --    --   13.1   INR   --    --    --   1.0   APTT  80.1*  113.1*   < >  61.8*    < > = values in this interval not displayed. Lipid Panel Lab Results   Component Value Date/Time    Cholesterol, total 203 05/15/2017 05:05 AM    HDL Cholesterol 62 05/15/2017 05:05 AM    LDL, calculated 121.8 05/15/2017 05:05 AM    VLDL, calculated 19.2 05/15/2017 05:05 AM    Triglyceride 96 05/15/2017 05:05 AM    CHOL/HDL Ratio 3.3 05/15/2017 05:05 AM      BNP No results for input(s): BNPP in the last 72 hours. Liver Enzymes Recent Labs      05/16/17   0504   TP  6.1*   ALB  2.5*   AP  62   SGOT  19      Thyroid Studies Lab Results   Component Value Date/Time    TSH 0.38 05/14/2017 07:51 AM        Procedures/imaging: see electronic medical records for all procedures/Xrays and details which were not copied into this note but were reviewed prior to creation of Plan.

## 2017-05-16 NOTE — ROUTINE PROCESS
0800 Report received from Mike Banda. Assessment completed. Patient A&Ox4. No s/s of discomfort or pain. 1948 Bedside and Verbal shift change report given to Citi (oncoming nurse) by Courtney Gaona RN (offgoing nurse).  Report included the following information SBAR, Kardex, Intake/Output, MAR, Recent Results and Cardiac Rhythm SR.

## 2017-05-16 NOTE — PROGRESS NOTES
[facility]      CARDIOLOGY PROGRESS NOTE  RECS:    1. Acute non-ST elevation myocardial infarction - currently chest pain free on heparin drip. Echo revealed preserved EF%. Will check stress test tomorrow. 2. Hypertension- elevated today. incr hydralazine & Norvasc  3. Hyperlipidemia- on statin elevated LDL. 4. Diabetes, treatment per medical team.  5. Severe obesity with sleep apnea on CPAP. 6. Renal insufficiency, which may be advanced. -continue IVF as renal function improving. patient is at high risk for HD post cath per nephrology note. 7. Anemia-slightly dropping monitor       ASSESSMENT:  Hospital Problems  Date Reviewed: 5/16/2017          Codes Class Noted POA    Chronic kidney disease, stage III (moderate) ICD-10-CM: N18.3  ICD-9-CM: 585.3  5/16/2017 Unknown        Anemia ICD-10-CM: D64.9  ICD-9-CM: 285.9  5/16/2017 Unknown        Hypertension ICD-10-CM: I10  ICD-9-CM: 401.9  5/16/2017 Unknown        NSTEMI (non-ST elevated myocardial infarction) [facility]) ICD-10-CM: I21.4  ICD-9-CM: 410.70  5/13/2017 Unknown                SUBJECTIVE:    No CP or SOB    OBJECTIVE:    VS:   Visit Vitals    /69 (BP 1 Location: Right arm, BP Patient Position: At rest)    Pulse 66    Temp 97.8 °F (36.6 °C)    Resp 20    Ht 5' 7\" (1.702 m)    Wt 123 kg (271 lb 1.6 oz)    SpO2 96%    BMI 42.46 kg/m2         Intake/Output Summary (Last 24 hours) at 05/16/17 1404  Last data filed at 05/16/17 1336   Gross per 24 hour   Intake              960 ml   Output             1225 ml   Net             -265 ml     TELE: normal sinus rhythm    General: in no apparent distress  HENT: Normocephalic, atraumatic. Normal external eye.   Neck :  JVD difficult to assess due to obesity  Cardiac:  regular rate and rhythm, S1, S2 normal, no murmur, click, rub or gallop  Lungs: clear to auscultation bilaterally  Abdomen: Soft, distended  , no masses  Extremities:  Mild 1+edema ble     Labs: Results: Chemistry Recent Labs      05/16/17   0504  05/14/17   0751  05/13/17 1935   GLU  96  160*  254*   NA  144  141  139   K  4.1  3.6  3.3*   CL  111*  104  99*   CO2  25  27  26   BUN  76*  106*  112*   CREA  2.52*  3.23*  3.48*   CA  8.9  8.6  8.7  9.1   AGAP  8  10  14   BUCR  30*  33*  32*   AP  62  68  91   TP  6.1*  6.9  8.3*   ALB  2.5*  3.0*  3.8   GLOB  3.6  3.9  4.5*   AGRAT  0.7*  0.8  0.8      CBC w/Diff Recent Labs      05/16/17   0504  05/14/17   0751 05/13/17 1935   WBC  9.5  10.3  14.6*   RBC  3.24*  3.37*  4.02*   HGB  8.5*  8.8*  10.4*   HCT  26.1*  27.1*  32.5*   PLT  215  195  248   GRANS  71  77*  86*   LYMPH  23  16*  9*   EOS  2  2  1      Cardiac Enzymes Recent Labs      05/13/17 1935   CPK  190   CKND1  2.1      Coagulation Recent Labs      05/16/17   1247  05/16/17   0504   05/14/17 0751   PTP   --    --    --   13.1   INR   --    --    --   1.0   APTT  80.1*  113.1*   < >  61.8*    < > = values in this interval not displayed. Lipid Panel Lab Results   Component Value Date/Time    Cholesterol, total 203 05/15/2017 05:05 AM    HDL Cholesterol 62 05/15/2017 05:05 AM    LDL, calculated 121.8 05/15/2017 05:05 AM    VLDL, calculated 19.2 05/15/2017 05:05 AM    Triglyceride 96 05/15/2017 05:05 AM    CHOL/HDL Ratio 3.3 05/15/2017 05:05 AM      BNP No results for input(s): BNPP in the last 72 hours. Liver Enzymes Recent Labs      05/16/17   0504   TP  6.1*   ALB  2.5*   AP  62   SGOT  19      Thyroid Studies Lab Results   Component Value Date/Time    TSH 0.38 05/14/2017 07:51 AM              Mariann Tate, NP   [phone]    Patient seen independently  Discussed the details with NP and patient.  Please see orders & recommendations  Kari Ordaz MD

## 2017-05-16 NOTE — ROUTINE PROCESS
Bedside and Verbal shift change report given to Guanako Sanches RN (oncoming nurse) by Rupert Villasenor RN (offgoing nurse). Report included the following information SBAR, Kardex, MAR and Recent Results.

## 2017-05-16 NOTE — PROGRESS NOTES
Progress Note    Janella Halsted.  66 y.o. Admit Date: 5/13/2017  Active Problems:    NSTEMI (non-ST elevated myocardial infarction) ([facility] [address] (5/13/2017) POA: Unknown      Chronic kidney disease, stage III (moderate) (5/16/2017) POA: Unknown      Anemia (5/16/2017) POA: Unknown      Hypertension (5/16/2017) POA: Unknown            Subjective:     Patient feels good,on IVF, on Heparin drip, no urinary complain      A comprehensive review of systems was negative except for that written in the History of Present Illness.     Objective:     Visit Vitals    /69 (BP 1 Location: Right arm, BP Patient Position: At rest)    Pulse 66    Temp 97.8 °F (36.6 °C)    Resp 20    Ht 5' 7\" (1.702 m)    Wt 123 kg (271 lb 1.6 oz)    SpO2 96%    BMI 42.46 kg/m2         Intake/Output Summary (Last 24 hours) at 05/16/17 1249  Last data filed at 05/16/17 1110   Gross per 24 hour   Intake              720 ml   Output             1225 ml   Net             -505 ml       Current Facility-Administered Medications   Medication Dose Route Frequency Provider Last Rate Last Dose    metoprolol tartrate (LOPRESSOR) tablet 12.5 mg  12.5 mg Oral BID Bessie Porter MD        allopurinol (ZYLOPRIM) tablet 150 mg  150 mg Oral DAILY Bonita Ryan III, MD   150 mg at 05/16/17 0904    amLODIPine (NORVASC) tablet 5 mg  5 mg Oral DAILY Bonita Ryan III, MD   5 mg at 05/16/17 7637    atorvastatin (LIPITOR) tablet 40 mg  40 mg Oral DAILY Bonita Ryan III, MD   40 mg at 05/16/17 5001    cholecalciferol (VITAMIN D3) tablet 5,000 Units  5,000 Units Oral DAILY Bonita Ryan III, MD   5,000 Units at 28/02/81 7009    folic acid-vit L0-LAK R10 (FOLTX) 2.5-25-2 mg tablet 1 Tab  1 Tab Oral DAILY Bonita Ryan III, MD   1 Tab at 05/16/17 0900    dutasteride (AVODART) capsule 0.5 mg  0.5 mg Oral DAILY Bonita Ryan III, MD   0.5 mg at 05/16/17 9544    hydrALAZINE (APRESOLINE) tablet 50 mg  50 mg Oral BID Nancy Kumari MD 50 mg at 05/16/17 8459    isosorbide mononitrate ER (IMDUR) tablet 30 mg  30 mg Oral DAILY Elis Dorado III, MD   30 mg at 05/16/17 3946    levETIRAcetam (KEPPRA) tablet 1,500 mg  1,500 mg Oral BID Elis Dorado III, MD   1,500 mg at 05/16/17 9303    levothyroxine (SYNTHROID) tablet 100 mcg  100 mcg Oral ACB Elis Dorado III, MD   100 mcg at 05/16/17 2346    potassium chloride (KLOR-CON) tablet 10 mEq  10 mEq Oral BID Elis Dorado III, MD   10 mEq at 05/16/17 0910    predniSONE (DELTASONE) tablet 5 mg  5 mg Oral DAILY Elis Dorado III, MD   5 mg at 05/16/17 0905    bimatoprost (LUMIGAN) 0.01 % ophthalmic drops 1 Drop  1 Drop Ophthalmic QPM Elis Dorado III, MD   1 Drop at 05/15/17 1714    magnesium hydroxide (MILK OF MAGNESIA) 400 mg/5 mL oral suspension 30 mL  30 mL Oral DAILY PRN Elis Dorado III, MD        docusate sodium (COLACE) capsule 100 mg  100 mg Oral BID Elis Dorado III, MD   100 mg at 05/16/17 5602    acetaminophen (TYLENOL) tablet 650 mg  650 mg Oral Q6H PRN Elis Dorado III, MD        morphine injection 2 mg  2 mg IntraVENous Q4H PRN Elis Dorado III, MD        nitroglycerin (NITROSTAT) tablet 0.4 mg  0.4 mg SubLINGual Q5MIN PRN Elis Dorado III, MD        aspirin chewable tablet 81 mg  81 mg Oral DAILY Elis Dorado III, MD   81 mg at 05/16/17 0902    clopidogrel (PLAVIX) tablet 75 mg  75 mg Oral DAILY Elis Dorado III, MD   75 mg at 05/16/17 0903    ondansetron (ZOFRAN) injection 4 mg  4 mg IntraVENous Q4H PRN Elis Dorado III, MD        naloxone [facility]) injection 0.4 mg  0.4 mg IntraVENous PRN Elis Dorado III, MD        diphenhydrAMINE (BENADRYL) injection 12.5 mg  12.5 mg IntraVENous Q4H PRN Elis Dorado III, MD        insulin lispro (HUMALOG) injection   SubCUTAneous AC&HS Angela Carson MD   Stopped at 05/15/17 2206    glucose chewable tablet 16 g  16 g Oral PRN Angela Carson MD        glucagon (GLUCAGEN) injection 1 mg  1 mg IntraMUSCular PRN Gildardo Ken MD        dextrose (D50W) injection syrg 12.5-25 g  25-50 mL IntraVENous PRN Gildardo Ken MD        0.9% sodium chloride infusion  125 mL/hr IntraVENous CONTINUOUS Gildardo Ken  mL/hr at 05/14/17 1730 125 mL/hr at 05/14/17 1730    nitroglycerin (NITROBID) 2 % ointment 0.5 Inch  0.5 Inch Topical BID Winnie Santiago MD   0.5 Inch at 05/16/17 0911    heparin 25,000 units in D5W 250 ml infusion  8.16-25 Units/kg/hr IntraVENous TITRATE Winnie Santiago MD 11 mL/hr at 05/16/17 0658 1,100 Units/hr at 05/16/17 0658        Physical Exam:     Physical Exam:   General:  Alert, cooperative, no distress, appears stated age. Neck: Supple, symmetrical, trachea midline, no adenopathy, thyroid: no enlargement/tenderness/nodules, no carotid bruit and no JVD. Lungs:   Clear to auscultation bilaterally. Heart:  Regular rate and rhythm, S1, S2 normal, no murmur, click, rub or gallop. Abdomen:   Soft, non-tender. Bowel sounds normal. No masses,  No organomegaly.    Extremities: Extremities normal, atraumatic, no cyanosis , has  edema         Data Review:    CBC w/Diff    Recent Labs      05/16/17   0504  05/14/17   0751 05/13/17 1935   WBC  9.5  10.3  14.6*   RBC  3.24*  3.37*  4.02*   HGB  8.5*  8.8*  10.4*   HCT  26.1*  27.1*  32.5*   MCV  80.6  80.4  80.8   MCH  26.2  26.1  25.9   MCHC  32.6  32.5  32.0   RDW  15.7*  15.9*  16.0*    Recent Labs      05/16/17   0504  05/14/17   0751 05/13/17 1935   MONOS  4  5  4   EOS  2  2  1   BASOS  0  0  0   RDW  15.7*  15.9*  16.0*        Comprehensive Metabolic Profile    Recent Labs      05/16/17   0504  05/14/17   0751  05/13/17 1935   NA  144  141  139   K  4.1  3.6  3.3*   CL  111*  104  99*   CO2  25  27  26   BUN  76*  106*  112*   CREA  2.52*  3.23*  3.48*    Recent Labs      05/16/17   0504  05/14/17   0751  05/13/17   1935   CA  8.9  8.6  8.7  9.1   PHOS  3.0   --    --    ALB  2.5*  3.0*  3.8   TP  6.1*  6.9  8.3* SGOT  19  24  21   TBILI  0.3  0.2  0.1*                        Impression:       Active Hospital Problems    Diagnosis Date Noted    Chronic kidney disease, stage III (moderate) 05/16/2017    Anemia 05/16/2017    Hypertension 05/16/2017    NSTEMI (non-ST elevated myocardial infarction) ([facility] [address] 05/13/2017      Renal function is little better  With hydration  & off ARB      Plan:     Very high risk patient for contrast nephropathy, await cardiology's decision.        Quita Christie MD

## 2017-05-17 PROBLEM — R19.5 HEME POSITIVE STOOL: Status: ACTIVE | Noted: 2017-05-17

## 2017-05-17 LAB
ANION GAP BLD CALC-SCNC: 4 MMOL/L (ref 3–18)
APTT PPP: 40.9 SEC (ref 23–36.4)
APTT PPP: 60.9 SEC (ref 23–36.4)
APTT PPP: 61.5 SEC (ref 23–36.4)
ATTENDING PHYSICIAN, CST07: NORMAL
BASOPHILS # BLD AUTO: 0 K/UL (ref 0–0.1)
BASOPHILS # BLD: 0 % (ref 0–2)
BUN SERPL-MCNC: 60 MG/DL (ref 7–18)
BUN/CREAT SERPL: 26 (ref 12–20)
CALCIUM SERPL-MCNC: 8.8 MG/DL (ref 8.5–10.1)
CHLORIDE SERPL-SCNC: 114 MMOL/L (ref 100–108)
CO2 SERPL-SCNC: 26 MMOL/L (ref 21–32)
CREAT SERPL-MCNC: 2.33 MG/DL (ref 0.6–1.3)
DIAGNOSIS, 93000: NORMAL
DIFFERENTIAL METHOD BLD: ABNORMAL
DUKE TM SCORE RESULT, CST14: NORMAL
DUKE TREADMILL SCORE, CST13: NORMAL
ECG INTERP BEFORE EX, CST11: NORMAL
ECG INTERP DURING EX, CST12: NORMAL
EOSINOPHIL # BLD: 0.2 K/UL (ref 0–0.4)
EOSINOPHIL NFR BLD: 2 % (ref 0–5)
ERYTHROCYTE [DISTWIDTH] IN BLOOD BY AUTOMATED COUNT: 15.9 % (ref 11.6–14.5)
FUNCTIONAL CAPACITY, CST17: NORMAL
GLUCOSE BLD STRIP.AUTO-MCNC: 126 MG/DL (ref 70–110)
GLUCOSE BLD STRIP.AUTO-MCNC: 166 MG/DL (ref 70–110)
GLUCOSE BLD STRIP.AUTO-MCNC: 198 MG/DL (ref 70–110)
GLUCOSE BLD STRIP.AUTO-MCNC: 97 MG/DL (ref 70–110)
GLUCOSE SERPL-MCNC: 100 MG/DL (ref 74–99)
HCT VFR BLD AUTO: 26.5 % (ref 36–48)
HGB BLD-MCNC: 8.6 G/DL (ref 13–16)
KNOWN CARDIAC CONDITION, CST08: NORMAL
LYMPHOCYTES # BLD AUTO: 19 % (ref 21–52)
LYMPHOCYTES # BLD: 1.7 K/UL (ref 0.9–3.6)
MAX. DIASTOLIC BP, CST04: 46 MMHG
MAX. HEART RATE, CST05: 84 BPM
MAX. SYSTOLIC BP, CST03: 123 MMHG
MCH RBC QN AUTO: 26.5 PG (ref 24–34)
MCHC RBC AUTO-ENTMCNC: 32.5 G/DL (ref 31–37)
MCV RBC AUTO: 81.5 FL (ref 74–97)
MONOCYTES # BLD: 0.2 K/UL (ref 0.05–1.2)
MONOCYTES NFR BLD AUTO: 3 % (ref 3–10)
NEUTS SEG # BLD: 6.6 K/UL (ref 1.8–8)
NEUTS SEG NFR BLD AUTO: 76 % (ref 40–73)
OVERALL BP RESPONSE TO EXERCISE, CST16: NORMAL
OVERALL HR RESPONSE TO EXERCISE, CST15: NORMAL
PEAK EX METS, CST10: 1 METS
PLATELET # BLD AUTO: 199 K/UL (ref 135–420)
PMV BLD AUTO: 11 FL (ref 9.2–11.8)
POTASSIUM SERPL-SCNC: 4.6 MMOL/L (ref 3.5–5.5)
PROTOCOL NAME, CST01: NORMAL
RBC # BLD AUTO: 3.25 M/UL (ref 4.7–5.5)
SODIUM SERPL-SCNC: 144 MMOL/L (ref 136–145)
TEST INDICATION, CST09: NORMAL
WBC # BLD AUTO: 8.7 K/UL (ref 4.6–13.2)

## 2017-05-17 PROCEDURE — 65660000000 HC RM CCU STEPDOWN

## 2017-05-17 PROCEDURE — 74011636637 HC RX REV CODE- 636/637: Performed by: HOSPITALIST

## 2017-05-17 PROCEDURE — 85025 COMPLETE CBC W/AUTO DIFF WBC: CPT | Performed by: HOSPITALIST

## 2017-05-17 PROCEDURE — 85730 THROMBOPLASTIN TIME PARTIAL: CPT | Performed by: HOSPITALIST

## 2017-05-17 PROCEDURE — 74011250636 HC RX REV CODE- 250/636: Performed by: HOSPITALIST

## 2017-05-17 PROCEDURE — 74011250636 HC RX REV CODE- 250/636: Performed by: INTERNAL MEDICINE

## 2017-05-17 PROCEDURE — 93017 CV STRESS TEST TRACING ONLY: CPT | Performed by: INTERNAL MEDICINE

## 2017-05-17 PROCEDURE — 82962 GLUCOSE BLOOD TEST: CPT

## 2017-05-17 PROCEDURE — 74011250637 HC RX REV CODE- 250/637: Performed by: HOSPITALIST

## 2017-05-17 PROCEDURE — 78452 HT MUSCLE IMAGE SPECT MULT: CPT | Performed by: INTERNAL MEDICINE

## 2017-05-17 PROCEDURE — 85730 THROMBOPLASTIN TIME PARTIAL: CPT | Performed by: INTERNAL MEDICINE

## 2017-05-17 PROCEDURE — 74011250637 HC RX REV CODE- 250/637: Performed by: EMERGENCY MEDICINE

## 2017-05-17 PROCEDURE — 80048 BASIC METABOLIC PNL TOTAL CA: CPT | Performed by: HOSPITALIST

## 2017-05-17 PROCEDURE — A9500 TC99M SESTAMIBI: HCPCS

## 2017-05-17 PROCEDURE — 36415 COLL VENOUS BLD VENIPUNCTURE: CPT | Performed by: HOSPITALIST

## 2017-05-17 PROCEDURE — 74011250637 HC RX REV CODE- 250/637: Performed by: INTERNAL MEDICINE

## 2017-05-17 PROCEDURE — 74011250636 HC RX REV CODE- 250/636: Performed by: EMERGENCY MEDICINE

## 2017-05-17 RX ORDER — HEPARIN SODIUM 1000 [USP'U]/ML
3000 INJECTION, SOLUTION INTRAVENOUS; SUBCUTANEOUS ONCE
Status: COMPLETED | OUTPATIENT
Start: 2017-05-17 | End: 2017-05-17

## 2017-05-17 RX ORDER — ROSUVASTATIN CALCIUM 20 MG/1
40 TABLET, COATED ORAL
Status: DISCONTINUED | OUTPATIENT
Start: 2017-05-17 | End: 2017-05-20 | Stop reason: HOSPADM

## 2017-05-17 RX ORDER — CLONIDINE HYDROCHLORIDE 0.1 MG/1
0.1 TABLET ORAL 2 TIMES DAILY
Status: DISCONTINUED | OUTPATIENT
Start: 2017-05-17 | End: 2017-05-20 | Stop reason: HOSPADM

## 2017-05-17 RX ADMIN — REGADENOSON 0.4 MG: 0.08 INJECTION, SOLUTION INTRAVENOUS at 08:50

## 2017-05-17 RX ADMIN — Medication 1 TABLET: at 09:00

## 2017-05-17 RX ADMIN — DUTASTERIDE 0.5 MG: 0.5 CAPSULE, LIQUID FILLED ORAL at 10:14

## 2017-05-17 RX ADMIN — ROSUVASTATIN CALCIUM 40 MG: 20 TABLET, FILM COATED ORAL at 21:13

## 2017-05-17 RX ADMIN — NITROGLYCERIN 0.5 INCH: 20 OINTMENT TOPICAL at 17:19

## 2017-05-17 RX ADMIN — ATORVASTATIN CALCIUM 40 MG: 40 TABLET, FILM COATED ORAL at 10:02

## 2017-05-17 RX ADMIN — HYDRALAZINE HYDROCHLORIDE 50 MG: 50 TABLET ORAL at 17:15

## 2017-05-17 RX ADMIN — LEVETIRACETAM 1500 MG: 500 TABLET ORAL at 09:31

## 2017-05-17 RX ADMIN — POTASSIUM CHLORIDE 10 MEQ: 10 TABLET, EXTENDED RELEASE ORAL at 17:19

## 2017-05-17 RX ADMIN — LEVOTHYROXINE SODIUM 100 MCG: 100 TABLET ORAL at 09:59

## 2017-05-17 RX ADMIN — PREDNISONE 5 MG: 10 TABLET ORAL at 10:01

## 2017-05-17 RX ADMIN — ALLOPURINOL 150 MG: 300 TABLET ORAL at 09:32

## 2017-05-17 RX ADMIN — BIMATOPROST 1 DROP: 0.1 SOLUTION/ DROPS OPHTHALMIC at 17:21

## 2017-05-17 RX ADMIN — DOCUSATE SODIUM 100 MG: 100 CAPSULE, LIQUID FILLED ORAL at 17:14

## 2017-05-17 RX ADMIN — CLONIDINE HYDROCHLORIDE 0.1 MG: 0.1 TABLET ORAL at 11:28

## 2017-05-17 RX ADMIN — DOCUSATE SODIUM 100 MG: 100 CAPSULE, LIQUID FILLED ORAL at 09:34

## 2017-05-17 RX ADMIN — HEPARIN SODIUM 3000 UNITS: 1000 INJECTION, SOLUTION INTRAVENOUS; SUBCUTANEOUS at 21:13

## 2017-05-17 RX ADMIN — HYDRALAZINE HYDROCHLORIDE 50 MG: 50 TABLET ORAL at 10:02

## 2017-05-17 RX ADMIN — CLOPIDOGREL BISULFATE 75 MG: 75 TABLET ORAL at 09:58

## 2017-05-17 RX ADMIN — INSULIN LISPRO 2 UNITS: 100 INJECTION, SOLUTION INTRAVENOUS; SUBCUTANEOUS at 21:15

## 2017-05-17 RX ADMIN — METOPROLOL TARTRATE 12.5 MG: 25 TABLET ORAL at 09:59

## 2017-05-17 RX ADMIN — POTASSIUM CHLORIDE 10 MEQ: 10 TABLET, EXTENDED RELEASE ORAL at 10:02

## 2017-05-17 RX ADMIN — NITROGLYCERIN 0.5 INCH: 20 OINTMENT TOPICAL at 10:06

## 2017-05-17 RX ADMIN — HEPARIN SODIUM 3000 UNITS: 1000 INJECTION, SOLUTION INTRAVENOUS; SUBCUTANEOUS at 11:29

## 2017-05-17 RX ADMIN — ASPIRIN 81 MG CHEWABLE TABLET 81 MG: 81 TABLET CHEWABLE at 09:59

## 2017-05-17 RX ADMIN — ISOSORBIDE MONONITRATE 30 MG: 30 TABLET, EXTENDED RELEASE ORAL at 09:34

## 2017-05-17 RX ADMIN — AMLODIPINE BESYLATE 10 MG: 10 TABLET ORAL at 09:31

## 2017-05-17 RX ADMIN — HYDRALAZINE HYDROCHLORIDE 50 MG: 50 TABLET ORAL at 21:13

## 2017-05-17 RX ADMIN — HEPARIN SODIUM AND DEXTROSE 1300 UNITS/HR: 10000; 5 INJECTION INTRAVENOUS at 18:13

## 2017-05-17 RX ADMIN — SODIUM CHLORIDE 100 ML/HR: 900 INJECTION, SOLUTION INTRAVENOUS at 21:18

## 2017-05-17 RX ADMIN — LEVETIRACETAM 1500 MG: 500 TABLET ORAL at 17:14

## 2017-05-17 RX ADMIN — CHOLECALCIFEROL TAB 25 MCG (1000 UNIT) 5000 UNITS: 25 TAB at 09:59

## 2017-05-17 RX ADMIN — INSULIN LISPRO 2 UNITS: 100 INJECTION, SOLUTION INTRAVENOUS; SUBCUTANEOUS at 17:21

## 2017-05-17 NOTE — NURSE NAVIGATOR
Visited with pt. He did his stress test today and is feeling ready to go home. Went back over his education regarding lifestyle changes. Pt agreed to work on all the things we talked about and plans a smooth transition home. Told him to make sure he gets out of the car and walks around every 2 hours minimum on the drive home. Reinforced taking his meds and doctor follow up. Teachback done.

## 2017-05-17 NOTE — PROGRESS NOTES
Patient was found to have heme positive stool. Had NSTMI acute. Last colonoscopy about 4 years ago. No pain or active bleeding. PE;   Visit Vitals    /73 (BP 1 Location: Left arm, BP Patient Position: At rest)    Pulse 70    Temp 97.5 °F (36.4 °C)    Resp 18    Ht 5' 7\" (1.702 m)    Wt 125.2 kg (276 lb)    SpO2 96%    BMI 43.23 kg/m2     Lungs CTA  Heart s1s2 normal  Abdomen benign exam  Recent Results (from the past 24 hour(s))   GLUCOSE, POC    Collection Time: 05/16/17  3:42 PM   Result Value Ref Range    Glucose (POC) 162 (H) 70 - 110 mg/dL   GLUCOSE, POC    Collection Time: 05/16/17  9:37 PM   Result Value Ref Range    Glucose (POC) 136 (H) 70 - 110 mg/dL   PTT    Collection Time: 05/17/17  2:10 AM   Result Value Ref Range    aPTT 61.5 (H) 23.0 - 05.6 SEC   METABOLIC PANEL, BASIC    Collection Time: 05/17/17  2:10 AM   Result Value Ref Range    Sodium 144 136 - 145 mmol/L    Potassium 4.6 3.5 - 5.5 mmol/L    Chloride 114 (H) 100 - 108 mmol/L    CO2 26 21 - 32 mmol/L    Anion gap 4 3.0 - 18 mmol/L    Glucose 100 (H) 74 - 99 mg/dL    BUN 60 (H) 7.0 - 18 MG/DL    Creatinine 2.33 (H) 0.6 - 1.3 MG/DL    BUN/Creatinine ratio 26 (H) 12 - 20      GFR est AA 33 (L) >60 ml/min/1.73m2    GFR est non-AA 28 (L) >60 ml/min/1.73m2    Calcium 8.8 8.5 - 10.1 MG/DL   CBC WITH AUTOMATED DIFF    Collection Time: 05/17/17  2:10 AM   Result Value Ref Range    WBC 8.7 4.6 - 13.2 K/uL    RBC 3.25 (L) 4.70 - 5.50 M/uL    HGB 8.6 (L) 13.0 - 16.0 g/dL    HCT 26.5 (L) 36.0 - 48.0 %    MCV 81.5 74.0 - 97.0 FL    MCH 26.5 24.0 - 34.0 PG    MCHC 32.5 31.0 - 37.0 g/dL    RDW 15.9 (H) 11.6 - 14.5 %    PLATELET 881 334 - 341 K/uL    MPV 11.0 9.2 - 11.8 FL    NEUTROPHILS 76 (H) 40 - 73 %    LYMPHOCYTES 19 (L) 21 - 52 %    MONOCYTES 3 3 - 10 %    EOSINOPHILS 2 0 - 5 %    BASOPHILS 0 0 - 2 %    ABS. NEUTROPHILS 6.6 1.8 - 8.0 K/UL    ABS. LYMPHOCYTES 1.7 0.9 - 3.6 K/UL    ABS. MONOCYTES 0.2 0.05 - 1.2 K/UL    ABS.  EOSINOPHILS 0.2 0.0 - 0.4 K/UL    ABS. BASOPHILS 0.0 0.0 - 0.1 K/UL    DF AUTOMATED     GLUCOSE, POC    Collection Time: 05/17/17  7:09 AM   Result Value Ref Range    Glucose (POC) 97 70 - 110 mg/dL   NUCLEAR STRESS TEST    Collection Time: 05/17/17  8:14 AM   Result Value Ref Range    Diagnosis      Test indication AMI     Functional capacity      ECG Interp. Before Exercise abnormal     ECG Interp. During Exercise      Overall HR response to exercise      Overall BP response to exercise      Max. Systolic  mmHg    Max. Diastolic BP 46 mmHg    Max. Heart rate 84 BPM    [facility] treadmill score      [place] TM score result      Peak Ex METs 1.0 METS    Protocol name LEXISCAN N/E         Known cardiac condition      Attending physician     PTT    Collection Time: 05/17/17 10:16 AM   Result Value Ref Range    aPTT 40.9 (H) 23.0 - 36.4 SEC   GLUCOSE, POC    Collection Time: 05/17/17 11:03 AM   Result Value Ref Range    Glucose (POC) 126 (H) 70 - 110 mg/dL   A/P: Heme positive stool. Multifactorial anemia. No evidence of active GI bleed. Recent MI. Would defer colonoscopy for 90 days. Continue diet. Ok to anticoagulate. Transfuse to keep h/h above 8/24 as it may drift down with blood draws. Renal insufficiency. If aspirin is used give PPI. Will arrange out patient visit with me in 8 weeks. Call us as needed.      Melissa Berry MD

## 2017-05-17 NOTE — ROUTINE PROCESS
0730. Report received from [facility]. Assessment completed. Patient A&Ox4. Denies any s/s of pain or discomfort. 0740 Off the floor for a nuclear stress test. Tele box off.  0810 Call lab for patient ptt to be drawn @ the nuclear med. 0930 Patient back on the floor. Tele box replaced. /68, HR 57  1000  @ bedside for ptt.     1600 Patient been transferred to [address] [place] [address]. TRANSFER - OUT REPORT:    Verbal report given to Lacey JOSHUA(name) on [employer].  being transferred to [address] [place] [address](unit) for routine progression of care       Report consisted of patients Situation, Background, Assessment and   Recommendations(SBAR). Information from the following report(s) SBAR, Kardex, Intake/Output, MAR and Recent Results was reviewed with the receiving nurse. Lines:   Peripheral IV 05/13/17 Left Antecubital (Active)   Site Assessment Clean, dry, & intact 5/17/2017  4:00 PM   Phlebitis Assessment 0 5/17/2017  4:00 PM   Infiltration Assessment 0 5/17/2017  4:00 PM   Dressing Status Clean, dry, & intact 5/17/2017  4:00 PM   Dressing Type Transparent;Tape 5/17/2017  4:00 PM   Hub Color/Line Status Pink;Flushed 5/17/2017  4:00 PM   Action Taken Blood drawn 5/13/2017  7:42 PM   Alcohol Cap Used No 5/17/2017  4:00 PM       Peripheral IV 05/14/17 Right Antecubital (Active)   Site Assessment Clean, dry, & intact 5/17/2017  4:00 PM   Phlebitis Assessment 0 5/17/2017  4:00 PM   Infiltration Assessment 0 5/17/2017  4:00 PM   Dressing Status Clean, dry, & intact 5/17/2017  4:00 PM   Dressing Type Transparent;Tape 5/17/2017  4:00 PM   Hub Color/Line Status Pink;Flushed 5/17/2017  4:00 PM   Alcohol Cap Used No 5/17/2017  4:00 PM        Opportunity for questions and clarification was provided. Patient transported with:   Tech   1720 Transport order put in for patient. 1208 [facility] lab for ptt to be drawn . 1750 ptt lab drawn by tech.

## 2017-05-17 NOTE — PROGRESS NOTES
[facility]      CARDIOLOGY PROGRESS NOTE  RECS:    1. Acute non-ST elevation myocardial infarction - currently chest pain free on heparin drip. Echo revealed preserved EF%. NUC stress test today  2. Hypertension- better control today continue  hydralazine & Norvasc. Add clonidine  3. Hyperlipidemia- on statin with elevated LDL. Change lipitor to crestor. .  4. Diabetes, treatment per medical team.  5. Severe obesity with sleep apnea on CPAP. 6. Renal insufficiency, which may be advanced. -continue IVF as renal function improving. Monitor for fluid overload  patient is at high risk for HD post cath per nephrology note. 7. Anemia-stable H&H          ASSESSMENT:  Hospital Problems  Date Reviewed: 5/16/2017          Codes Class Noted POA    Chronic kidney disease, stage III (moderate) ICD-10-CM: N18.3  ICD-9-CM: 585.3  5/16/2017 Unknown        Anemia ICD-10-CM: D64.9  ICD-9-CM: 285.9  5/16/2017 Unknown        Hypertension ICD-10-CM: I10  ICD-9-CM: 401.9  5/16/2017 Unknown        NSTEMI (non-ST elevated myocardial infarction) [facility]) ICD-10-CM: I21.4  ICD-9-CM: 410.70  5/13/2017 Unknown                SUBJECTIVE:    No CP or SOB  Feels good today. OBJECTIVE:    VS:   Visit Vitals    /84 (BP 1 Location: Left arm, BP Patient Position: At rest)    Pulse (!) 55    Temp 98.3 °F (36.8 °C)    Resp 20    Ht 5' 7\" (1.702 m)    Wt 125.2 kg (276 lb)    SpO2 94%    BMI 43.23 kg/m2         Intake/Output Summary (Last 24 hours) at 05/17/17 1004  Last data filed at 05/17/17 0453   Gross per 24 hour   Intake              480 ml   Output              600 ml   Net             -120 ml     TELE: normal sinus rhythm    General: in no apparent distress  HENT: Normocephalic, atraumatic. Normal external eye.   Neck :  JVD difficult to assess due to obesity  Cardiac:  regular rate and rhythm, S1, S2 normal, no murmur, click, rub or gallop  Lungs: clear to auscultation bilaterally  Abdomen: Soft, distended  , no masses  Extremities:  Mild 1+edema ble     Labs: Results:       Chemistry Recent Labs      05/17/17   0210  05/16/17   0504   GLU  100*  96   NA  144  144   K  4.6  4.1   CL  114*  111*   CO2  26  25   BUN  60*  76*   CREA  2.33*  2.52*   CA  8.8  8.9  8.6   AGAP  4  8   BUCR  26*  30*   AP   --   62   TP   --   6.1*   ALB   --   2.5*   GLOB   --   3.6   AGRAT   --   0.7*      CBC w/Diff Recent Labs      05/17/17   0210  05/16/17   0504   WBC  8.7  9.5   RBC  3.25*  3.24*   HGB  8.6*  8.5*   HCT  26.5*  26.1*   PLT  199  215   GRANS  76*  71   LYMPH  19*  23   EOS  2  2      Cardiac Enzymes No results for input(s): CPK, CKND1, AHSAN in the last 72 hours. No lab exists for component: CKRMB, TROIP   Coagulation Recent Labs      05/17/17   0210  05/16/17   1247   APTT  61.5*  80.1*       Lipid Panel Lab Results   Component Value Date/Time    Cholesterol, total 203 05/15/2017 05:05 AM    HDL Cholesterol 62 05/15/2017 05:05 AM    LDL, calculated 121.8 05/15/2017 05:05 AM    VLDL, calculated 19.2 05/15/2017 05:05 AM    Triglyceride 96 05/15/2017 05:05 AM    CHOL/HDL Ratio 3.3 05/15/2017 05:05 AM      BNP No results for input(s): BNPP in the last 72 hours. Liver Enzymes Recent Labs      05/16/17   0504   TP  6.1*   ALB  2.5*   AP  62   SGOT  19      Thyroid Studies Lab Results   Component Value Date/Time    TSH 0.38 05/14/2017 07:51 AM              Mariann Baltazar NP   [phone]    Patient seen independently  Discussed the details with NP and patient.  Please see orders & recommendations  Aleena Salomon MD

## 2017-05-17 NOTE — PROGRESS NOTES
Bedside and Verbal shift change report given to [address] [place] [address] (oncoming nurse) by Dipika Villavicencio (offgoing nurse). Report included the following information SBAR and Recent Results.

## 2017-05-17 NOTE — PROGRESS NOTES
Care Management Interventions  PCP Verified by CM: Yes (PCP is Dr Umang Raman in [place])  Mode of Transport at Discharge:  (wife)  Transition of Care Consult (CM Consult):  (declines need for [facility] or H2[facility] because pt plans to return to [facility] as soon as dc'ed )  Discharge Durable Medical Equipment: No (Has own cane)  Physical Therapy Consult: Yes  Occupational Therapy Consult: No  Speech Therapy Consult: No  Current Support Network: Lives with Spouse  Confirm Follow Up Transport: Family  Plan discussed with Pt/Family/Caregiver: Yes  Discharge Location  Discharge Placement: Home (home to [facility] )   Gissell Or - wife - [phone]  DME - cane / CPAP    Met with pt who is TATI melton. States he was visiting [place], [place] from his home in [facility] when he became ill & was admitted. On IV Heparin drip & for Nuclear Stress test today with diag acute NSTEMI. He states his wife is staying currently with her sister & that he is eager for dc so he can return home by car to [facility] with his wife driving. Denies need for HHC at [place]. CM will follow. Medicare Important Message given to pt with copy of signature page to chart.

## 2017-05-17 NOTE — PROCEDURES
[address] STRESS    Name:  Elliott Bloom  MR#:  [id number]  :  1945  Account #:  [de-identified]  Date of Adm:  2017  Date of Service:  2017      DIAGNOSES  1. Acute non-ST elevation myocardial infarction. 2. Renal insufficiency. BASELINE DATA:  Heart rate was 56. Blood pressure 117/64. EKG  showed sinus bradycardia, with a normal axis. The inferior and lateral  T-wave inversion was small, but wide. A Q-wave was seen in the  inferior leads. This may indicate a possible old inferior myocardial  infarction and inferolateral ischemia. PROCEDURE NOTE:  Lexiscan 0.4 mg was given intravenously over  15 to 20 seconds, followed by sestamibi injection during leg exercises. Heart rate increased up to 82. Blood pressure increased up to 123/46  and then reduced to 114/40. No new ST-T changes were seen. CONCLUSION  1. No ischemic ST-T changes after Lexiscan infusion, with an  abnormal baseline electrocardiogram.  2. Appropriate heart rate and blood pressure response. 3. No symptoms or arrhythmias noted. 4. Perfusion imaging report to follow. PROCEDURE NOTE:  Sestamibi 10.9 mCi was given for the resting  images and 33 mCi for stress images after Lexiscan infusion  intravenously. Images were obtained in multiple planes, reconstructed  and gated in the usual fashion. The following are the findings:    1. Stress images revealed severely reduced sestamibi uptake in the  entire inferior wall and the adjoining inferolateral segment, as well as a  small area of the anterolateral wall at the mid ventricular level seen in  short axis, vertical, and horizontal long axis views. 2. Resting images had evidence of redistribution in the anterolateral  wall. There was partial redistribution in the inferior wall and moderate  redistribution in the inferior base.   3. Gated images revealed normal wall motion, and an ejection fraction  was calculated at 47%. CONCLUSION  1. Abnormal perfusion scan. 2. Evidence of a large inferior, inferolateral, and a small anterolateral  defect as described above, with evidence of partial redistribution in the  large inferior segment and moderate redistribution in the inferolateral  and anterolateral area, indicating at least one and possibly two-vessel  coronary artery disease. 3. Normal wall motion and preserved ejection fraction at 47%. Clinical  correlation is suggested. 4. This represents a moderate to high risk scan due to a large area of  involvement, but some of this could also be due to diaphragmatic  attenuation in this severely obese patient.         Amparo Marc MD    BG / [address]  D:  05/17/2017   12:23  T:  05/17/2017   13:48  Job #:  [id number]

## 2017-05-17 NOTE — PROGRESS NOTES
Hospitalist Progress Note    Patient: Yogesh Petersen. MRN: [id number]  [facility]: [id number]    YOB: 1945  Age: 66 y.o. Sex: male    DOA: 5/13/2017 LOS:  LOS: 4 days          Creatinine down to 2.33. Denies chest pain or shortness of breath. Results of stress test discussed with patient and wife at bedside. Assessment/Plan     1. NSTEMI - on medical management. Stress test noted. Possible cath at the discretion of cardiology, pending improvement in renal function. 2. Hypertension - diuretics held, continue current regimen, cards following. 3. dyslipidemia. On Lipitor. 4. DM2 - ssi, A1c 8.6. Diabetes educator worked with him. 5. oksana - home cpap (ordered). 6. Elevated creatinine - unknown baseline - no records available electronically in cc - nephrology following. 7. Leukocytosis improved - likely reactive in the setting of MI.  8. Stool + for occult blood - GI recs noted. 9. Elevated D-dimer: CTA chest not done secondary to his renal failure. on full-dose heparin. VQ: Low probability ventilation perfusion scan. 10. Morbid obesity Body mass index is 43.23 kg/(m^2). 11. Full code . Stable for tele. I discussed the case with Dr. Crystal Agrawal. Additional Notes:      Case discussed with:  [x]Patient  [x]Family  [x]Nursing  []Case Management  DVT Prophylaxis:  []Lovenox  []Hep SQ  []SCDs  []Coumadin   [x]On Heparin gtt    Vital signs/Intake and Output:  Visit Vitals    /73 (BP 1 Location: Left arm, BP Patient Position: At rest)    Pulse 70    Temp 97.5 °F (36.4 °C)    Resp 18    Ht 5' 7\" (1.702 m)    Wt 125.2 kg (276 lb)    SpO2 96%    BMI 43.23 kg/m2     Current Shift:  05/17 0701 - 05/17 1900  In: 960 [P.O.:960]  Out: -   Last three shifts:  05/15 1901 - 05/17 0700  In: 720 [P.O.:720]  Out: 1625 [Urine:1625]    Awake alert and oriented. Obese. Sitting up in chair. Wife at bedside.    Ncat. perrl  RRR  cta b.l  Obese soft nt nd nabs  No edema  No focal deficit  No rash    Medications Reviewed      Labs: Results:       Chemistry Recent Labs      05/17/17   0210  05/16/17   0504   GLU  100*  96   NA  144  144   K  4.6  4.1   CL  114*  111*   CO2  26  25   BUN  60*  76*   CREA  2.33*  2.52*   CA  8.8  8.9  8.6   AGAP  4  8   BUCR  26*  30*   AP   --   62   TP   --   6.1*   ALB   --   2.5*   GLOB   --   3.6   AGRAT   --   0.7*      CBC w/Diff Recent Labs      05/17/17   0210  05/16/17   0504   WBC  8.7  9.5   RBC  3.25*  3.24*   HGB  8.6*  8.5*   HCT  26.5*  26.1*   PLT  199  215   GRANS  76*  71   LYMPH  19*  23   EOS  2  2      Cardiac Enzymes No results for input(s): CPK, CKND1, AHSAN in the last 72 hours. No lab exists for component: CKRMB, TROIP   Coagulation Recent Labs      05/17/17   1016  05/17/17   0210   APTT  40.9*  61.5*       Lipid Panel Lab Results   Component Value Date/Time    Cholesterol, total 203 05/15/2017 05:05 AM    HDL Cholesterol 62 05/15/2017 05:05 AM    LDL, calculated 121.8 05/15/2017 05:05 AM    VLDL, calculated 19.2 05/15/2017 05:05 AM    Triglyceride 96 05/15/2017 05:05 AM    CHOL/HDL Ratio 3.3 05/15/2017 05:05 AM      BNP No results for input(s): BNPP in the last 72 hours. Liver Enzymes Recent Labs      05/16/17   0504   TP  6.1*   ALB  2.5*   AP  62   SGOT  19      Thyroid Studies Lab Results   Component Value Date/Time    TSH 0.38 05/14/2017 07:51 AM        Procedures/imaging: see electronic medical records for all procedures/Xrays and details which were not copied into this note but were reviewed prior to creation of Plan.

## 2017-05-17 NOTE — PROGRESS NOTES
Orders received, chart reviewed. Patient is currently on bedrest.  Will need activity level increased to proceed with PT evaluation. Thank you.

## 2017-05-17 NOTE — PROGRESS NOTES
Patient was given 10.94 mCi of Sestamibi for the Resting pictures. Patient received 0.4 mg of Lexiscan for the exercise portion of the Stress test. Patient was then given 33 mCi of Sestamibi for the Stress pictures. Patient went back to room with armband still on.

## 2017-05-17 NOTE — DIABETES MGMT
Glycemic Control Plan of Care    Recommendations: Continue monitoring and correctional lispro insulin. Assessment:   Patient is 66year old with past medical history including type 2 diabetes mellitus, CKD stage III, dyslipidemia, hypertension, and obesity - was admitted on 05/15/2017 c/o chest tightness. Patient from out [facility]) visiting. Noted:  Acute non STEMI. Type 2 diabetes mellitus with current A1C of 8.6% (05/15/2017). POC BG range on 05/16/2017: 105-162 mg/dL. POC BG report on 05/17/2017 at time of review: 97, 126 mg/dL. Patient verbalized understanding of inpatient glycemic management using correctional insulin. Most recent blood glucose values:    Results for Deepak Ramsey (MRN [id number]) as of 5/17/2017 11:27   Ref. Range 5/16/2017 07:15 5/16/2017 11:08 5/16/2017 15:42 5/16/2017 21:37   GLUCOSE,FAST - POC Latest Ref Range: 70 - 110 mg/dL 105 125 (H) 162 (H) 136 (H)     Results for Deepak Ramsey (MRN [id number]) as of 5/17/2017 11:27   Ref. Range 5/17/2017 07:09 5/17/2017 11:03   GLUCOSE,FAST - POC Latest Ref Range: 70 - 110 mg/dL 97 126 (H)     Current A1C: 8.6% (05/15/2017) is equivalent to average blood glucose of 200 mg/dL during the past 2-3 months. Current hospital diabetes medications:  Correctional lispro insulin ACHS. Normal sensitivity dose. Total daily dose insulin requirement previous day: 05/16/2017  Lispro: 2 units    Home diabetes medications: As stated by patient on 05/17/2017:  Amaryl 2 mg daily every morning. Diet: Diabetic consistent carb 1500-1600kcal; regular; no concentrated sweets. Goals:  Blood glucose will be within target range of  mg/dL by 05/20/2017.      Education:  _X__  Refer to Diabetes Education Record: 05/15/2017             ___  Education not indicated at this time      Kike Barclay RN

## 2017-05-17 NOTE — PROGRESS NOTES
Progress Note    Julius Benz.  66 y.o. Admit Date: 5/13/2017  Principal Problem:    Heme positive stool (5/17/2017) POA: Yes    Active Problems:    NSTEMI (non-ST elevated myocardial infarction) ([facility] [address] (5/13/2017) POA: Unknown      Chronic kidney disease, stage III (moderate) (5/16/2017) POA: Unknown      Anemia (5/16/2017) POA: Unknown      Hypertension (5/16/2017) POA: Unknown            Subjective:     Patient feels good,  No SOB, on  IVF, had Rectal Bleeding. .On heparin drip. A comprehensive review of systems was negative except for that written in the History of Present Illness.     Objective:     Visit Vitals    /72 (BP 1 Location: Right arm, BP Patient Position: Sitting)    Pulse (!) 54    Temp 97.8 °F (36.6 °C)    Resp 20    Ht 5' 7\" (1.702 m)    Wt 125.2 kg (276 lb)    SpO2 95%    BMI 43.23 kg/m2         Intake/Output Summary (Last 24 hours) at 05/17/17 1832  Last data filed at 05/17/17 1732   Gross per 24 hour   Intake              960 ml   Output              500 ml   Net              460 ml       Current Facility-Administered Medications   Medication Dose Route Frequency Provider Last Rate Last Dose    rosuvastatin (CRESTOR) tablet 40 mg  40 mg Oral QHS Reed Dejesus MD        cloNIDine HCl (CATAPRES) tablet 0.1 mg  0.1 mg Oral BID Reed Dejesus MD   Stopped at 05/17/17 1715    metoprolol tartrate (LOPRESSOR) tablet 12.5 mg  12.5 mg Oral BID Reed Dejesus MD   Stopped at 05/17/17 1718    hydrALAZINE (APRESOLINE) tablet 50 mg  50 mg Oral TID Reed Dejesus MD   50 mg at 05/17/17 1715    amLODIPine (NORVASC) tablet 10 mg  10 mg Oral DAILY Reed Dejesus MD   10 mg at 05/17/17 0931    allopurinol (ZYLOPRIM) tablet 150 mg  150 mg Oral DAILY Todd Sarmiento III, MD   150 mg at 05/17/17 0932    cholecalciferol (VITAMIN D3) tablet 5,000 Units  5,000 Units Oral DAILY Todd Sarmiento III, MD   5,000 Units at 38/06/32 7250    folic acid-vit Z0-QNU Q46 (FOLTX) 2.5-25-2 mg tablet 1 Tab  1 Tab Oral DAILY Arthur Hernandez III, MD   1 Tab at 05/17/17 0900    dutasteride (AVODART) capsule 0.5 mg  0.5 mg Oral DAILY Arthur Hernandez III, MD   0.5 mg at 05/17/17 1014    isosorbide mononitrate ER (IMDUR) tablet 30 mg  30 mg Oral DAILY Arthur Hernandez III, MD   30 mg at 05/17/17 0934    levETIRAcetam (KEPPRA) tablet 1,500 mg  1,500 mg Oral BID Arthur Hernandez III, MD   1,500 mg at 05/17/17 1714    levothyroxine (SYNTHROID) tablet 100 mcg  100 mcg Oral ACB Arthur Hernandez III, MD   100 mcg at 05/17/17 0959    potassium chloride (KLOR-CON) tablet 10 mEq  10 mEq Oral BID Arthur Hernandez III, MD   10 mEq at 05/17/17 1719    predniSONE (DELTASONE) tablet 5 mg  5 mg Oral DAILY Arthur Hernandez III, MD   5 mg at 05/17/17 1001    bimatoprost (LUMIGAN) 0.01 % ophthalmic drops 1 Drop  1 Drop Ophthalmic QPM Arthur Hernandez III, MD   1 Drop at 05/17/17 1721    magnesium hydroxide (MILK OF MAGNESIA) 400 mg/5 mL oral suspension 30 mL  30 mL Oral DAILY PRN Arthur Hernandez III, MD        docusate sodium (COLACE) capsule 100 mg  100 mg Oral BID Arhtur Hernandez III, MD   100 mg at 05/17/17 1714    acetaminophen (TYLENOL) tablet 650 mg  650 mg Oral Q6H PRN Arthur Hernandez III, MD        morphine injection 2 mg  2 mg IntraVENous Q4H PRN Arthur Hernandez III, MD        nitroglycerin (NITROSTAT) tablet 0.4 mg  0.4 mg SubLINGual Q5MIN PRN Arthur Hernandez III, MD        aspirin chewable tablet 81 mg  81 mg Oral DAILY Arthur Hernandez III, MD   81 mg at 05/17/17 0959    clopidogrel (PLAVIX) tablet 75 mg  75 mg Oral DAILY Arthur Hernandez III, MD   75 mg at 05/17/17 0958    ondansetron (ZOFRAN) injection 4 mg  4 mg IntraVENous Q4H PRN Arthur Hernandez III, MD        naloxone [facility]) injection 0.4 mg  0.4 mg IntraVENous PRN Arthur Hernandez III, MD        diphenhydrAMINE (BENADRYL) injection 12.5 mg  12.5 mg IntraVENous Q4H PRN Arthur Hernandez III, MD        insulin lispro (HUMALOG) injection   SubCUTAneous AC&HS Stephenie Parikh MD   2 Units at 05/17/17 1721    glucose chewable tablet 16 g  16 g Oral PRN Stephenie Parikh MD        glucagon (GLUCAGEN) injection 1 mg  1 mg IntraMUSCular PRN Stephenie Parikh MD        dextrose (D50W) injection syrg 12.5-25 g  25-50 mL IntraVENous PRN Stephenie Parikh MD        0.9% sodium chloride infusion  100 mL/hr IntraVENous CONTINUOUS Dl Zelaya  mL/hr at 05/16/17 1756 100 mL/hr at 05/16/17 1756    nitroglycerin (NITROBID) 2 % ointment 0.5 Inch  0.5 Inch Topical BID Gerardo Goodwin MD   0.5 Inch at 05/17/17 1719    heparin 25,000 units in D5W 250 ml infusion  8.16-25 Units/kg/hr IntraVENous TITRATE Gerardo Goodwin MD 13 mL/hr at 05/17/17 1813 1,300 Units/hr at 05/17/17 1813        Physical Exam:     Physical Exam:   General:  Alert, cooperative, no distress, appears stated age. Neck: Supple, symmetrical, trachea midline, no adenopathy, thyroid: no enlargement/tenderness/nodules, no carotid bruit and no JVD. Lungs:   Clear to auscultation bilaterally. Heart:  Regular rate and rhythm, S1, S2 normal, no murmur, click, rub or gallop. Abdomen:   Soft, non-tender. Bowel sounds normal. No masses,  No organomegaly.    Extremities: Extremities normal, atraumatic,  2 pus edema         Data Review:    CBC w/Diff    Recent Labs      05/17/17   0210  05/16/17   0504   WBC  8.7  9.5   RBC  3.25*  3.24*   HGB  8.6*  8.5*   HCT  26.5*  26.1*   MCV  81.5  80.6   MCH  26.5  26.2   MCHC  32.5  32.6   RDW  15.9*  15.7*    Recent Labs      05/17/17   0210  05/16/17   0504   MONOS  3  4   EOS  2  2   BASOS  0  0   RDW  15.9*  15.7*        Comprehensive Metabolic Profile    Recent Labs      05/17/17   0210  05/16/17   0504   NA  144  144   K  4.6  4.1   CL  114*  111*   CO2  26  25   BUN  60*  76*   CREA  2.33*  2.52*    Recent Labs      05/17/17   0210  05/16/17   0504   CA  8.8  8.9  8.6   PHOS   --   3.0   ALB   --   2.5*   TP   --   6.1*   SGOT   -- 19   TBILI   --   0.3              }        Impression:       Active Hospital Problems    Diagnosis Date Noted    Heme positive stool 05/17/2017    Chronic kidney disease, stage III (moderate) 05/16/2017    Anemia 05/16/2017    Hypertension 05/16/2017    NSTEMI (non-ST elevated myocardial infarction) ([facility] [address] 05/13/2017      Renal Function Getting better with Hydration but getting Edematous, off Diuretics & of ARB, has massive Proteinuria. Plan:     Contin ue careful hydration but decrease  IVF rate 75 cc /hour. Await   Cardiac stress test report. , will follow, GI note reviewed.       Brodie Agarwal MD

## 2017-05-18 LAB
ANION GAP BLD CALC-SCNC: 7 MMOL/L (ref 3–18)
ANION GAP BLD CALC-SCNC: 7 MMOL/L (ref 3–18)
APTT PPP: 111 SEC (ref 23–36.4)
APTT PPP: 128.7 SEC (ref 23–36.4)
APTT PPP: 88.5 SEC (ref 23–36.4)
BUN SERPL-MCNC: 46 MG/DL (ref 7–18)
BUN SERPL-MCNC: 50 MG/DL (ref 7–18)
BUN/CREAT SERPL: 22 (ref 12–20)
BUN/CREAT SERPL: 23 (ref 12–20)
CALCIUM SERPL-MCNC: 8.3 MG/DL (ref 8.5–10.1)
CALCIUM SERPL-MCNC: 8.6 MG/DL (ref 8.5–10.1)
CHLORIDE SERPL-SCNC: 114 MMOL/L (ref 100–108)
CHLORIDE SERPL-SCNC: 115 MMOL/L (ref 100–108)
CO2 SERPL-SCNC: 22 MMOL/L (ref 21–32)
CO2 SERPL-SCNC: 24 MMOL/L (ref 21–32)
CREAT SERPL-MCNC: 2.11 MG/DL (ref 0.6–1.3)
CREAT SERPL-MCNC: 2.17 MG/DL (ref 0.6–1.3)
GLUCOSE BLD STRIP.AUTO-MCNC: 127 MG/DL (ref 70–110)
GLUCOSE BLD STRIP.AUTO-MCNC: 133 MG/DL (ref 70–110)
GLUCOSE BLD STRIP.AUTO-MCNC: 170 MG/DL (ref 70–110)
GLUCOSE BLD STRIP.AUTO-MCNC: 90 MG/DL (ref 70–110)
GLUCOSE SERPL-MCNC: 114 MG/DL (ref 74–99)
GLUCOSE SERPL-MCNC: 144 MG/DL (ref 74–99)
POTASSIUM SERPL-SCNC: 4.5 MMOL/L (ref 3.5–5.5)
POTASSIUM SERPL-SCNC: 5.1 MMOL/L (ref 3.5–5.5)
SODIUM SERPL-SCNC: 143 MMOL/L (ref 136–145)
SODIUM SERPL-SCNC: 146 MMOL/L (ref 136–145)

## 2017-05-18 PROCEDURE — 74011250637 HC RX REV CODE- 250/637: Performed by: HOSPITALIST

## 2017-05-18 PROCEDURE — 97161 PT EVAL LOW COMPLEX 20 MIN: CPT

## 2017-05-18 PROCEDURE — 74011250637 HC RX REV CODE- 250/637: Performed by: INTERNAL MEDICINE

## 2017-05-18 PROCEDURE — 74011250637 HC RX REV CODE- 250/637: Performed by: EMERGENCY MEDICINE

## 2017-05-18 PROCEDURE — 65660000000 HC RM CCU STEPDOWN

## 2017-05-18 PROCEDURE — 36415 COLL VENOUS BLD VENIPUNCTURE: CPT | Performed by: HOSPITALIST

## 2017-05-18 PROCEDURE — 80048 BASIC METABOLIC PNL TOTAL CA: CPT | Performed by: HOSPITALIST

## 2017-05-18 PROCEDURE — 74011250636 HC RX REV CODE- 250/636: Performed by: HOSPITALIST

## 2017-05-18 PROCEDURE — 82962 GLUCOSE BLOOD TEST: CPT

## 2017-05-18 PROCEDURE — 85730 THROMBOPLASTIN TIME PARTIAL: CPT | Performed by: HOSPITALIST

## 2017-05-18 PROCEDURE — 74011250636 HC RX REV CODE- 250/636: Performed by: PHYSICIAN ASSISTANT

## 2017-05-18 PROCEDURE — 74011636637 HC RX REV CODE- 636/637: Performed by: HOSPITALIST

## 2017-05-18 PROCEDURE — 97530 THERAPEUTIC ACTIVITIES: CPT

## 2017-05-18 RX ORDER — SODIUM CHLORIDE 9 MG/ML
75 INJECTION, SOLUTION INTRAVENOUS CONTINUOUS
Status: DISCONTINUED | OUTPATIENT
Start: 2017-05-18 | End: 2017-05-20 | Stop reason: HOSPADM

## 2017-05-18 RX ORDER — HEPARIN SODIUM 10000 [USP'U]/100ML
8.16-25 INJECTION, SOLUTION INTRAVENOUS
Status: DISCONTINUED | OUTPATIENT
Start: 2017-05-19 | End: 2017-05-19

## 2017-05-18 RX ADMIN — ISOSORBIDE MONONITRATE 30 MG: 30 TABLET, EXTENDED RELEASE ORAL at 09:09

## 2017-05-18 RX ADMIN — CHOLECALCIFEROL TAB 25 MCG (1000 UNIT) 5000 UNITS: 25 TAB at 09:06

## 2017-05-18 RX ADMIN — ASPIRIN 81 MG CHEWABLE TABLET 81 MG: 81 TABLET CHEWABLE at 09:06

## 2017-05-18 RX ADMIN — DOCUSATE SODIUM 100 MG: 100 CAPSULE, LIQUID FILLED ORAL at 09:08

## 2017-05-18 RX ADMIN — HYDRALAZINE HYDROCHLORIDE 50 MG: 50 TABLET ORAL at 21:51

## 2017-05-18 RX ADMIN — POTASSIUM CHLORIDE 10 MEQ: 10 TABLET, EXTENDED RELEASE ORAL at 09:06

## 2017-05-18 RX ADMIN — HEPARIN SODIUM AND DEXTROSE 999.6 UNITS/HR: 10000; 5 INJECTION INTRAVENOUS at 23:43

## 2017-05-18 RX ADMIN — HYDRALAZINE HYDROCHLORIDE 50 MG: 50 TABLET ORAL at 16:40

## 2017-05-18 RX ADMIN — PREDNISONE 5 MG: 10 TABLET ORAL at 09:08

## 2017-05-18 RX ADMIN — METOPROLOL TARTRATE 12.5 MG: 25 TABLET ORAL at 09:09

## 2017-05-18 RX ADMIN — HYDRALAZINE HYDROCHLORIDE 50 MG: 50 TABLET ORAL at 09:08

## 2017-05-18 RX ADMIN — INSULIN LISPRO 2 UNITS: 100 INJECTION, SOLUTION INTRAVENOUS; SUBCUTANEOUS at 18:00

## 2017-05-18 RX ADMIN — LEVOTHYROXINE SODIUM 100 MCG: 100 TABLET ORAL at 09:09

## 2017-05-18 RX ADMIN — LEVETIRACETAM 1500 MG: 500 TABLET ORAL at 09:06

## 2017-05-18 RX ADMIN — DOCUSATE SODIUM 100 MG: 100 CAPSULE, LIQUID FILLED ORAL at 17:54

## 2017-05-18 RX ADMIN — NITROGLYCERIN 0.5 INCH: 20 OINTMENT TOPICAL at 18:10

## 2017-05-18 RX ADMIN — AMLODIPINE BESYLATE 10 MG: 10 TABLET ORAL at 09:06

## 2017-05-18 RX ADMIN — POTASSIUM CHLORIDE 10 MEQ: 10 TABLET, EXTENDED RELEASE ORAL at 17:54

## 2017-05-18 RX ADMIN — CLOPIDOGREL BISULFATE 75 MG: 75 TABLET ORAL at 09:08

## 2017-05-18 RX ADMIN — BIMATOPROST 1 DROP: 0.1 SOLUTION/ DROPS OPHTHALMIC at 18:00

## 2017-05-18 RX ADMIN — SODIUM CHLORIDE 100 ML/HR: 900 INJECTION, SOLUTION INTRAVENOUS at 21:52

## 2017-05-18 RX ADMIN — METOPROLOL TARTRATE 12.5 MG: 25 TABLET ORAL at 17:54

## 2017-05-18 RX ADMIN — Medication 1 TABLET: at 09:00

## 2017-05-18 RX ADMIN — LEVETIRACETAM 1500 MG: 500 TABLET ORAL at 18:09

## 2017-05-18 RX ADMIN — ROSUVASTATIN CALCIUM 40 MG: 20 TABLET, FILM COATED ORAL at 21:51

## 2017-05-18 RX ADMIN — NITROGLYCERIN 0.5 INCH: 20 OINTMENT TOPICAL at 09:09

## 2017-05-18 RX ADMIN — ALLOPURINOL 150 MG: 300 TABLET ORAL at 09:07

## 2017-05-18 RX ADMIN — DUTASTERIDE 0.5 MG: 0.5 CAPSULE, LIQUID FILLED ORAL at 12:57

## 2017-05-18 RX ADMIN — CLONIDINE HYDROCHLORIDE 0.1 MG: 0.1 TABLET ORAL at 09:06

## 2017-05-18 RX ADMIN — CLONIDINE HYDROCHLORIDE 0.1 MG: 0.1 TABLET ORAL at 17:54

## 2017-05-18 NOTE — PROGRESS NOTES
Progress Note    Diogenes Hendricks.  66 y.o. Admit Date: 5/13/2017  Principal Problem:    Heme positive stool (5/17/2017) POA: Yes    Active Problems:    NSTEMI (non-ST elevated myocardial infarction) ([facility] [address] (5/13/2017) POA: Unknown      Chronic kidney disease, stage III (moderate) (5/16/2017) POA: Unknown      Anemia (5/16/2017) POA: Unknown      Hypertension (5/16/2017) POA: Unknown            Subjective:     Patient feels god, no more Chest pain, No SOB, OOB to chair, off IVF. A comprehensive review of systems was negative except for that written in the History of Present Illness.     Objective:     Visit Vitals    /72 (BP 1 Location: Left arm, BP Patient Position: At rest)    Pulse (!) 58    Temp 97.7 °F (36.5 °C)    Resp 18    Ht 5' 7\" (1.702 m)    Wt 125.6 kg (277 lb)    SpO2 98%    BMI 43.38 kg/m2         Intake/Output Summary (Last 24 hours) at 05/18/17 1445  Last data filed at 05/18/17 1435   Gross per 24 hour   Intake              740 ml   Output             1200 ml   Net             -460 ml       Current Facility-Administered Medications   Medication Dose Route Frequency Provider Last Rate Last Dose    rosuvastatin (CRESTOR) tablet 40 mg  40 mg Oral QHS Jose Sesay MD   40 mg at 05/17/17 2113    cloNIDine HCl (CATAPRES) tablet 0.1 mg  0.1 mg Oral BID Jose Sesay MD   0.1 mg at 05/18/17 5463    metoprolol tartrate (LOPRESSOR) tablet 12.5 mg  12.5 mg Oral BID Jose Sesay MD   12.5 mg at 05/18/17 8346    hydrALAZINE (APRESOLINE) tablet 50 mg  50 mg Oral TID Jose Sesay MD   50 mg at 05/18/17 0908    amLODIPine (NORVASC) tablet 10 mg  10 mg Oral DAILY Jose Sesay MD   10 mg at 05/18/17 1499    allopurinol (ZYLOPRIM) tablet 150 mg  150 mg Oral DAILY Yariel Ruth III, MD   150 mg at 05/18/17 9255    cholecalciferol (VITAMIN D3) tablet 5,000 Units  5,000 Units Oral DAILY Natha Eibill MORALES MD   5,000 Units at 16/12/16 1933    folic acid-vit b6-vit b12 (FOLTX) 2.5-25-2 mg tablet 1 Tab  1 Tab Oral DAILY Aydee Allison III, MD   1 Tab at 05/18/17 0900    dutasteride (AVODART) capsule 0.5 mg  0.5 mg Oral DAILY Aydee Allison III, MD   0.5 mg at 05/18/17 1257    isosorbide mononitrate ER (IMDUR) tablet 30 mg  30 mg Oral DAILY Aydee Allison III, MD   30 mg at 05/18/17 8811    levETIRAcetam (KEPPRA) tablet 1,500 mg  1,500 mg Oral BID Aydee Allison III, MD   1,500 mg at 05/18/17 7032    levothyroxine (SYNTHROID) tablet 100 mcg  100 mcg Oral ACB Aydee Allison III, MD   100 mcg at 05/18/17 1939    potassium chloride (KLOR-CON) tablet 10 mEq  10 mEq Oral BID Aydee Allison III, MD   10 mEq at 05/18/17 7103    predniSONE (DELTASONE) tablet 5 mg  5 mg Oral DAILY Aydee Allison III, MD   5 mg at 05/18/17 0908    bimatoprost (LUMIGAN) 0.01 % ophthalmic drops 1 Drop  1 Drop Ophthalmic QPM Aydee Allison III, MD   1 Drop at 05/17/17 1721    magnesium hydroxide (MILK OF MAGNESIA) 400 mg/5 mL oral suspension 30 mL  30 mL Oral DAILY PRN Aydee Allison III, MD        docusate sodium (COLACE) capsule 100 mg  100 mg Oral BID Aydee Allison III, MD   100 mg at 05/18/17 0908    acetaminophen (TYLENOL) tablet 650 mg  650 mg Oral Q6H PRN Aydee Allison III, MD        morphine injection 2 mg  2 mg IntraVENous Q4H PRN Aydee Allison III, MD        nitroglycerin (NITROSTAT) tablet 0.4 mg  0.4 mg SubLINGual Q5MIN PRN Aydee Allison III, MD        aspirin chewable tablet 81 mg  81 mg Oral DAILY Aydee Allison III, MD   81 mg at 05/18/17 4378    clopidogrel (PLAVIX) tablet 75 mg  75 mg Oral DAILY Aydee Allison III, MD   75 mg at 05/18/17 0908    ondansetron (ZOFRAN) injection 4 mg  4 mg IntraVENous Q4H PRN Aydee Allison III, MD        naloxone [facility]) injection 0.4 mg  0.4 mg IntraVENous PRN Aydee Allison III, MD        diphenhydrAMINE (BENADRYL) injection 12.5 mg  12.5 mg IntraVENous Q4H PRN Aydee Allison III, MD        insulin lispro (HUMALOG) injection   SubCUTAneous AC&HS Fela Walker MD   Stopped at 05/18/17 0911    glucose chewable tablet 16 g  16 g Oral PRN Fela Walker MD        glucagon (GLUCAGEN) injection 1 mg  1 mg IntraMUSCular PRN Fela Walker MD        dextrose (D50W) injection syrg 12.5-25 g  25-50 mL IntraVENous PRN Fela Walker MD        0.9% sodium chloride infusion  100 mL/hr IntraVENous CONTINUOUS Rosibel Jefferson  mL/hr at 05/17/17 2118 100 mL/hr at 05/17/17 2118    nitroglycerin (NITROBID) 2 % ointment 0.5 Inch  0.5 Inch Topical BID Tsering Johnson MD   0.5 Inch at 05/18/17 0909        Physical Exam:     Physical Exam:   General:  Alert, cooperative, no distress, appears stated age. Lungs:   Clear to auscultation bilaterally. Heart:  Regular rate and rhythm, S1, S2 normal, no murmur, click, rub or gallop. Abdomen:   Soft, non-tender. Bowel sounds normal. No masses,  No organomegaly.    Extremities: Extremities normal, atraumatic, no cyanosis or edema, r   Skin: Skin color, texture, turgor normal. No rashes or lesions         Data Review:    CBC w/Diff    Recent Labs      05/17/17   0210  05/16/17   0504   WBC  8.7  9.5   RBC  3.25*  3.24*   HGB  8.6*  8.5*   HCT  26.5*  26.1*   MCV  81.5  80.6   MCH  26.5  26.2   MCHC  32.5  32.6   RDW  15.9*  15.7*    Recent Labs      05/17/17   0210  05/16/17   0504   MONOS  3  4   EOS  2  2   BASOS  0  0   RDW  15.9*  15.7*        Comprehensive Metabolic Profile    Recent Labs      05/18/17   1015  05/18/17   0040  05/17/17   0210   NA  146*  143  144   K  4.5  5.1  4.6   CL  115*  114*  114*   CO2  24  22  26   BUN  46*  50*  60*   CREA  2.11*  2.17*  2.33*    Recent Labs      05/18/17   1015  05/18/17   0040  05/17/17   0210  05/16/17   0504   CA  8.6  8.3*  8.8  8.9  8.6   PHOS   --    --    --   3.0   ALB   --    --    --   2.5*   TP   --    --    --   6.1*   SGOT   --    --    --   19   TBILI   --    --    --   0.3                        Impression: Active Hospital Problems    Diagnosis Date Noted    Heme positive stool 05/17/2017    Chronic kidney disease, stage III (moderate) 05/16/2017    Anemia 05/16/2017    Hypertension 05/16/2017    NSTEMI (non-ST elevated myocardial infarction) ([facility] [address] 05/13/2017            Plan:     Continued improvement of Renal function ,off ARB, has Diabetic nephropathy, Once Cardiac issue settled may need to restart ARB.       Quita Christie MD

## 2017-05-18 NOTE — PROGRESS NOTES
Problem: Mobility Impaired (Adult and Pediatric)  Goal: *Acute Goals and Plan of Care (Insert Text)  Physical Therapy Goals  Initiated 5/18/2017 and to be accomplished within 7 day(s)  1. Patient will move from supine to sit and sit to supine in bed with modified independence. 2. Patient will transfer from bed to chair and chair to bed with modified independence using the least restrictive device. 3. Patient will perform sit to stand with modified independence. 4. Patient will ambulate with modified independence for 300 feet with the least restrictive device. 5. Patient will ascend/descend 3 stairs with 1 handrail(s) with supervision/set-up. PHYSICAL THERAPY EVALUATION     Patient: Vivian Varma (76 y.o. male)  Date: 5/18/2017  Primary Diagnosis: NSTEMI (non-ST elevated myocardial infarction) ([facility] [place] [address]  NSTEMI (non-ST elevated myocardial infarction) ([facility] [address]        Precautions: fall         ASSESSMENT :  Based on the objective data described below, the patient presents with decreased strength, balance and activity tolerance resulting in decreased independence with functional mobility. Pt transferred with mod I and ambulated with RW for 150 feet with SBA. Patient will benefit from skilled intervention to address the above impairments.   Patients rehabilitation potential is considered to be Good  Factors which may influence rehabilitation potential include:   [ ]         None noted  [ ]         Mental ability/status  [X]         Medical condition  [ ]         Home/family situation and support systems  [ ]         Safety awareness  [ ]         Pain tolerance/management  [ ]         Other:        PLAN :  Recommendations and Planned Interventions:  [X]           Bed Mobility Training             [X]    Neuromuscular Re-Education  [X]           Transfer Training                   [ ]    Orthotic/Prosthetic Training  [X]           Gait Training                          [ ]    Modalities  [X] Therapeutic Exercises          [X]    Edema Management/Control  [X]           Therapeutic Activities            [X]    Patient and Family Training/Education  [ ]           Other (comment):     Frequency/Duration: Patient will be followed by physical therapy 1-2 times per day/4-7 days per week to address goals. Discharge Recommendations: Home Health  Further Equipment Recommendations for Discharge: rolling walker       G-CODES       Mobility  Current  CI= 1-19%   Goal  CI= 1-19%. The severity rating is based on the Level of Assistance required for Functional Mobility and ADLs. Eval Complexity: History: MEDIUM  Complexity : 1-2 comorbidities / personal factors will impact the outcome/ POC Exam:LOW Complexity : 1-2 Standardized tests and measures addressing body structure, function, activity limitation and / or participation in recreation  Presentation: LOW Complexity : Stable, uncomplicated  Clinical Decision Making:Low Complexity , Overall Complexity:LOW       SUBJECTIVE:   Patient stated I'm feeling good.       OBJECTIVE DATA SUMMARY:       Past Medical History:   Diagnosis Date    Glaucoma      Heart disease      Hypertension      [facility] ([facility] [address]      Pituitary tumor       Past Surgical History:   Procedure Laterality Date    HX CORONARY STENT PLACEMENT         Prior Level of Function/Home Situation: ambulated with straight cane  Home Situation  Home Environment: Private residence  # Steps to Enter: 0  One/Two Story Residence: One story  Living Alone: No  Support Systems: Child(corey), [employer] / lala community, Family member(s), Friends \ neighbors  Patient Expects to be Discharged to[de-identified] Private residence  Current DME Used/Available at Home: None  Critical Behavior:  Neurologic State: Alert  Orientation Level: Appropriate for age;Oriented X4  Cognition: Appropriate decision making; Follows commands        Strength:    Strength: Generally decreased, functional (B LEs)      Tone & Sensation:   Tone: Normal (B LEs)   Range Of Motion:  AROM: Generally decreased, functional (B LEs)      Functional Mobility:  Bed Mobility:  Supine to Sit: Independent  Transfers:  Sit to Stand: Independent  Stand to Sit: Independent  Balance:   Sitting: Intact  Standing: With support  Standing - Static:  (fair+)  Standing - Dynamic : Fair  Ambulation/Gait Training:  Distance (ft): 150 Feet (ft)  Assistive Device: Walker, rolling  Ambulation - Level of Assistance: Stand-by asssistance  Gait Abnormalities: Decreased step clearance        Pain:  Pt reports 0/10 pain or discomfort prior to treatment. Pt reports 0/10 pain or discomfort post treatment. Activity Tolerance:   fair  Please refer to the flowsheet for vital signs taken during this treatment. After treatment:   [X]         Patient left in no apparent distress sitting up in chair  [ ]         Patient left in no apparent distress in bed  [X]         Call bell left within reach  [X]         Nursing notified  [ ]         Caregiver present  [ ]         Bed alarm activated      COMMUNICATION/EDUCATION:   [X]         Fall prevention education was provided and the patient/caregiver indicated understanding. [X]         Patient/family have participated as able in goal setting and plan of care. [X]         Patient/family agree to work toward stated goals and plan of care. [ ]         Patient understands intent and goals of therapy, but is neutral about his/her participation. [ ]         Patient is unable to participate in goal setting and plan of care.   Educated patient on activity pacing and using RW due to decreased balance     Thank you for this referral.  Courtney Cormier, PT   Time Calculation: 30 mins

## 2017-05-18 NOTE — PROGRESS NOTES
0700-Bedside shift change report given to Ginette Trevino RN (oncoming nurse) by Tyrone Cota Rn (offgoing nurse). Report included the following information SBAR, Kardex and MAR.

## 2017-05-18 NOTE — ROUTINE PROCESS
Bedside and Verbal shift change report received from Xiomara. Report included the following information SBAR, Kardex, MAR and Recent Results. Pt sitting up in recliner with family. Denies needs, call light in reach. Lolly Watts RN    3720- Spoke to Brooks Camposma, pt has decided to continue with cardiac cath. New orders to restart heparin gtt and keep pt NPO. Lolly Watts RN    0800-Bedside and Verbal shift change report given to RN (oncoming nurse) by   Jaswant Monroy RN (offgoing nurse). Report included the following information SBAR, Kardex, MAR and Recent Results. Patient resting in bed, denies needs. Call light within reach. Heparin gtt verified and rate change per order.  Lolly Watts RN

## 2017-05-18 NOTE — ROUTINE PROCESS
Bedside and Verbal shift change report received from [facility]. Report included the following information SBAR, Kardex, MAR and Recent Results. Pt resting in bed, family at bedside. Heparin gtt verified. Call light in reach. Mary Loya RN    0008-Bedside and Verbal shift change report given to [employer] (oncoming nurse) by   Samara Ormond, RN (offgoing nurse). Report included the following information SBAR, Kardex, MAR and Recent Results. Patient resting in bed, denies needs. Call light within reach.

## 2017-05-18 NOTE — PROGRESS NOTES
[facility]      CARDIOLOGY PROGRESS NOTE  RECS:    1. Acute non-ST elevation myocardial infarction - currently chest pain free on heparin drip. Echo revealed preserved EF%. NUC stress test today report reviewed. 2. Hypertension- better control today continue  hydralazine & Norvasc. Add clonidine  3. Hyperlipidemia- on statin with elevated LDL. Change lipitor to crestor. .  4. Diabetes, treatment per medical team.  5. Severe obesity with sleep apnea on CPAP. 6. Renal insufficiency, which may be advanced. -continue IVF as renal function improving. Monitor for fluid overload  patient is at high risk for HD post cath per nephrology note. 7. Anemia-stable H&H      Patient needs cardiac cath due to NSTEMI/ abnormal stress test.  Patient refuses to have any procedure done here--wants to go back to [facility] to see his cardiologist-- he understands that he is at risk of recurrent MI/ arrhythmi and death- he is AAOx3- but insists to leave againist medical advise. Will discontinue heparin drip. And if renal thinks his renal indices are stable- to discontinue iv fluids. ASSESSMENT:  Hospital Problems  Date Reviewed: 5/17/2017          Codes Class Noted POA    * (Principal)Heme positive stool ICD-10-CM: R19.5  ICD-9-CM: 792.1  5/17/2017 Yes        Chronic kidney disease, stage III (moderate) ICD-10-CM: N18.3  ICD-9-CM: 585.3  5/16/2017 Unknown        Anemia ICD-10-CM: D64.9  ICD-9-CM: 285.9  5/16/2017 Unknown        Hypertension ICD-10-CM: I10  ICD-9-CM: 401.9  5/16/2017 Unknown        NSTEMI (non-ST elevated myocardial infarction) [facility]) ICD-10-CM: I21.4  ICD-9-CM: 410.70  5/13/2017 Unknown                SUBJECTIVE:    No CP or SOB  Feels good today.     OBJECTIVE:    VS:   Visit Vitals    /71    Pulse 61    Temp 97.9 °F (36.6 °C)    Resp 18    Ht 5' 7\" (1.702 m)    Wt 125.6 kg (277 lb)    SpO2 97%    BMI 43.38 kg/m2         Intake/Output Summary (Last 24 hours) at 05/18/17 8347  Last data filed at 05/18/17 0503   Gross per 24 hour   Intake             1200 ml   Output             1000 ml   Net              200 ml     TELE: normal sinus rhythm    General: in no apparent distress  HENT: Normocephalic, atraumatic. Normal external eye. Neck :  JVD difficult to assess due to obesity  Cardiac:  regular rate and rhythm, S1, S2 normal, no murmur, click, rub or gallop  Lungs: clear to auscultation bilaterally  Abdomen: Soft, distended  , no masses  Extremities:  Mild 1+edema ble     Labs: Results:       Chemistry Recent Labs      05/18/17   0040  05/17/17   0210  05/16/17   0504   GLU  144*  100*  96   NA  143  144  144   K  5.1  4.6  4.1   CL  114*  114*  111*   CO2  22  26  25   BUN  50*  60*  76*   CREA  2.17*  2.33*  2.52*   CA  8.3*  8.8  8.9  8.6   AGAP  7  4  8   BUCR  23*  26*  30*   AP   --    --   62   TP   --    --   6.1*   ALB   --    --   2.5*   GLOB   --    --   3.6   AGRAT   --    --   0.7*      CBC w/Diff Recent Labs      05/17/17   0210  05/16/17   0504   WBC  8.7  9.5   RBC  3.25*  3.24*   HGB  8.6*  8.5*   HCT  26.5*  26.1*   PLT  199  215   GRANS  76*  71   LYMPH  19*  23   EOS  2  2      Cardiac Enzymes No results for input(s): CPK, CKND1, AHSAN in the last 72 hours. No lab exists for component: CKRMB, TROIP   Coagulation Recent Labs      05/18/17   0640  05/18/17   0040   APTT  128.7*  88.5*       Lipid Panel Lab Results   Component Value Date/Time    Cholesterol, total 203 05/15/2017 05:05 AM    HDL Cholesterol 62 05/15/2017 05:05 AM    LDL, calculated 121.8 05/15/2017 05:05 AM    VLDL, calculated 19.2 05/15/2017 05:05 AM    Triglyceride 96 05/15/2017 05:05 AM    CHOL/HDL Ratio 3.3 05/15/2017 05:05 AM      BNP No results for input(s): BNPP in the last 72 hours.    Liver Enzymes Recent Labs      05/16/17   0504   TP  6.1*   ALB  2.5*   AP  62   SGOT  19      Thyroid Studies Lab Results   Component Value Date/Time    TSH 0.38 05/14/2017 07:51 AM              Hamlet Yates Iqra Gilbert MD

## 2017-05-19 LAB
ACT BLD: 168 SECS (ref 79–138)
ACT BLD: 198 SECS (ref 79–138)
ACT BLD: 204 SECS (ref 79–138)
ANION GAP BLD CALC-SCNC: 6 MMOL/L (ref 3–18)
APTT PPP: 45.1 SEC (ref 23–36.4)
APTT PPP: 50.1 SEC (ref 23–36.4)
ATRIAL RATE: 57 BPM
BASOPHILS # BLD AUTO: 0 K/UL (ref 0–0.1)
BASOPHILS # BLD: 0 % (ref 0–2)
BUN SERPL-MCNC: 44 MG/DL (ref 7–18)
BUN/CREAT SERPL: 21 (ref 12–20)
CALCIUM SERPL-MCNC: 8.5 MG/DL (ref 8.5–10.1)
CALCULATED P AXIS, ECG09: 71 DEGREES
CALCULATED R AXIS, ECG10: 21 DEGREES
CALCULATED T AXIS, ECG11: 131 DEGREES
CHLORIDE SERPL-SCNC: 114 MMOL/L (ref 100–108)
CO2 SERPL-SCNC: 23 MMOL/L (ref 21–32)
CREAT SERPL-MCNC: 2.09 MG/DL (ref 0.6–1.3)
DIAGNOSIS, 93000: NORMAL
DIFFERENTIAL METHOD BLD: ABNORMAL
EOSINOPHIL # BLD: 0.2 K/UL (ref 0–0.4)
EOSINOPHIL NFR BLD: 3 % (ref 0–5)
ERYTHROCYTE [DISTWIDTH] IN BLOOD BY AUTOMATED COUNT: 16 % (ref 11.6–14.5)
ERYTHROCYTE [DISTWIDTH] IN BLOOD BY AUTOMATED COUNT: 16.2 % (ref 11.6–14.5)
GLUCOSE BLD STRIP.AUTO-MCNC: 101 MG/DL (ref 70–110)
GLUCOSE BLD STRIP.AUTO-MCNC: 102 MG/DL (ref 70–110)
GLUCOSE BLD STRIP.AUTO-MCNC: 191 MG/DL (ref 70–110)
GLUCOSE BLD STRIP.AUTO-MCNC: 84 MG/DL (ref 70–110)
GLUCOSE SERPL-MCNC: 90 MG/DL (ref 74–99)
HCT VFR BLD AUTO: 25 % (ref 36–48)
HCT VFR BLD AUTO: 25.3 % (ref 36–48)
HGB BLD-MCNC: 8.1 G/DL (ref 13–16)
HGB BLD-MCNC: 8.3 G/DL (ref 13–16)
LYMPHOCYTES # BLD AUTO: 18 % (ref 21–52)
LYMPHOCYTES # BLD: 1.4 K/UL (ref 0.9–3.6)
MCH RBC QN AUTO: 26 PG (ref 24–34)
MCH RBC QN AUTO: 26.4 PG (ref 24–34)
MCHC RBC AUTO-ENTMCNC: 32.4 G/DL (ref 31–37)
MCHC RBC AUTO-ENTMCNC: 32.8 G/DL (ref 31–37)
MCV RBC AUTO: 80.4 FL (ref 74–97)
MCV RBC AUTO: 80.6 FL (ref 74–97)
MONOCYTES # BLD: 0.3 K/UL (ref 0.05–1.2)
MONOCYTES NFR BLD AUTO: 4 % (ref 3–10)
NEUTS SEG # BLD: 5.9 K/UL (ref 1.8–8)
NEUTS SEG NFR BLD AUTO: 75 % (ref 40–73)
P-R INTERVAL, ECG05: 196 MS
PLATELET # BLD AUTO: 177 K/UL (ref 135–420)
PLATELET # BLD AUTO: 185 K/UL (ref 135–420)
PMV BLD AUTO: 10.7 FL (ref 9.2–11.8)
PMV BLD AUTO: 10.8 FL (ref 9.2–11.8)
POTASSIUM SERPL-SCNC: 4.8 MMOL/L (ref 3.5–5.5)
Q-T INTERVAL, ECG07: 438 MS
QRS DURATION, ECG06: 84 MS
QTC CALCULATION (BEZET), ECG08: 426 MS
RBC # BLD AUTO: 3.11 M/UL (ref 4.7–5.5)
RBC # BLD AUTO: 3.14 M/UL (ref 4.7–5.5)
SODIUM SERPL-SCNC: 143 MMOL/L (ref 136–145)
VENTRICULAR RATE, ECG03: 57 BPM
WBC # BLD AUTO: 10.4 K/UL (ref 4.6–13.2)
WBC # BLD AUTO: 7.8 K/UL (ref 4.6–13.2)

## 2017-05-19 PROCEDURE — 85025 COMPLETE CBC W/AUTO DIFF WBC: CPT | Performed by: INTERNAL MEDICINE

## 2017-05-19 PROCEDURE — 77030027138 HC INCENT SPIROMETER -A

## 2017-05-19 PROCEDURE — 93458 L HRT ARTERY/VENTRICLE ANGIO: CPT

## 2017-05-19 PROCEDURE — 85730 THROMBOPLASTIN TIME PARTIAL: CPT | Performed by: HOSPITALIST

## 2017-05-19 PROCEDURE — 85027 COMPLETE CBC AUTOMATED: CPT | Performed by: INTERNAL MEDICINE

## 2017-05-19 PROCEDURE — 74011000250 HC RX REV CODE- 250: Performed by: HOSPITALIST

## 2017-05-19 PROCEDURE — 93005 ELECTROCARDIOGRAM TRACING: CPT

## 2017-05-19 PROCEDURE — 74011636320 HC RX REV CODE- 636/320: Performed by: INTERNAL MEDICINE

## 2017-05-19 PROCEDURE — 99152 MOD SED SAME PHYS/QHP 5/>YRS: CPT

## 2017-05-19 PROCEDURE — 74011250637 HC RX REV CODE- 250/637: Performed by: EMERGENCY MEDICINE

## 2017-05-19 PROCEDURE — 74011250637 HC RX REV CODE- 250/637: Performed by: INTERNAL MEDICINE

## 2017-05-19 PROCEDURE — 027034Z DILATION OF CORONARY ARTERY, ONE ARTERY WITH DRUG-ELUTING INTRALUMINAL DEVICE, PERCUTANEOUS APPROACH: ICD-10-PCS | Performed by: INTERNAL MEDICINE

## 2017-05-19 PROCEDURE — 74011000250 HC RX REV CODE- 250: Performed by: INTERNAL MEDICINE

## 2017-05-19 PROCEDURE — 74011250636 HC RX REV CODE- 250/636: Performed by: INTERNAL MEDICINE

## 2017-05-19 PROCEDURE — 74011636637 HC RX REV CODE- 636/637: Performed by: HOSPITALIST

## 2017-05-19 PROCEDURE — C1769 GUIDE WIRE: HCPCS

## 2017-05-19 PROCEDURE — 65620000000 HC RM CCU GENERAL

## 2017-05-19 PROCEDURE — 77030013797 HC KT TRNSDUC PRSSR EDWD -A

## 2017-05-19 PROCEDURE — 74011250636 HC RX REV CODE- 250/636: Performed by: HOSPITALIST

## 2017-05-19 PROCEDURE — C1894 INTRO/SHEATH, NON-LASER: HCPCS

## 2017-05-19 PROCEDURE — C1874 STENT, COATED/COV W/DEL SYS: HCPCS

## 2017-05-19 PROCEDURE — 77030015766

## 2017-05-19 PROCEDURE — 92928 PRQ TCAT PLMT NTRAC ST 1 LES: CPT

## 2017-05-19 PROCEDURE — 74011000250 HC RX REV CODE- 250

## 2017-05-19 PROCEDURE — 77030029997 HC DEV COM RDL R BND TELE -B

## 2017-05-19 PROCEDURE — 74011250637 HC RX REV CODE- 250/637: Performed by: HOSPITALIST

## 2017-05-19 PROCEDURE — 99153 MOD SED SAME PHYS/QHP EA: CPT

## 2017-05-19 PROCEDURE — 36415 COLL VENOUS BLD VENIPUNCTURE: CPT | Performed by: INTERNAL MEDICINE

## 2017-05-19 PROCEDURE — 82962 GLUCOSE BLOOD TEST: CPT

## 2017-05-19 PROCEDURE — C1725 CATH, TRANSLUMIN NON-LASER: HCPCS

## 2017-05-19 PROCEDURE — B2101ZZ FLUOROSCOPY OF SINGLE CORONARY ARTERY USING LOW OSMOLAR CONTRAST: ICD-10-PCS | Performed by: INTERNAL MEDICINE

## 2017-05-19 PROCEDURE — C1887 CATHETER, GUIDING: HCPCS

## 2017-05-19 PROCEDURE — 77030004534 HC CATH ANGI DX INFN CARD -A

## 2017-05-19 PROCEDURE — 85347 COAGULATION TIME ACTIVATED: CPT

## 2017-05-19 PROCEDURE — 80048 BASIC METABOLIC PNL TOTAL CA: CPT | Performed by: INTERNAL MEDICINE

## 2017-05-19 PROCEDURE — 4A023N7 MEASUREMENT OF CARDIAC SAMPLING AND PRESSURE, LEFT HEART, PERCUTANEOUS APPROACH: ICD-10-PCS | Performed by: INTERNAL MEDICINE

## 2017-05-19 RX ORDER — HEPARIN SODIUM 200 [USP'U]/100ML
1000 INJECTION, SOLUTION INTRAVENOUS ONCE
Status: COMPLETED | OUTPATIENT
Start: 2017-05-19 | End: 2017-05-19

## 2017-05-19 RX ORDER — VERAPAMIL HYDROCHLORIDE 2.5 MG/ML
5 INJECTION, SOLUTION INTRAVENOUS ONCE
Status: COMPLETED | OUTPATIENT
Start: 2017-05-19 | End: 2017-05-19

## 2017-05-19 RX ORDER — CLOPIDOGREL 300 MG/1
300 TABLET, FILM COATED ORAL ONCE
Status: DISCONTINUED | OUTPATIENT
Start: 2017-05-19 | End: 2017-05-19 | Stop reason: HOSPADM

## 2017-05-19 RX ORDER — FENTANYL CITRATE 50 UG/ML
25-100 INJECTION, SOLUTION INTRAMUSCULAR; INTRAVENOUS
Status: DISCONTINUED | OUTPATIENT
Start: 2017-05-19 | End: 2017-05-19 | Stop reason: HOSPADM

## 2017-05-19 RX ORDER — VERAPAMIL HYDROCHLORIDE 2.5 MG/ML
INJECTION, SOLUTION INTRAVENOUS
Status: COMPLETED
Start: 2017-05-19 | End: 2017-05-19

## 2017-05-19 RX ORDER — SODIUM CHLORIDE 0.9 % (FLUSH) 0.9 %
5-10 SYRINGE (ML) INJECTION EVERY 8 HOURS
Status: DISCONTINUED | OUTPATIENT
Start: 2017-05-19 | End: 2017-05-20 | Stop reason: HOSPADM

## 2017-05-19 RX ORDER — SODIUM CHLORIDE 0.9 % (FLUSH) 0.9 %
5-10 SYRINGE (ML) INJECTION AS NEEDED
Status: DISCONTINUED | OUTPATIENT
Start: 2017-05-19 | End: 2017-05-20 | Stop reason: HOSPADM

## 2017-05-19 RX ORDER — MIDAZOLAM HYDROCHLORIDE 1 MG/ML
1-4 INJECTION, SOLUTION INTRAMUSCULAR; INTRAVENOUS
Status: DISCONTINUED | OUTPATIENT
Start: 2017-05-19 | End: 2017-05-19 | Stop reason: HOSPADM

## 2017-05-19 RX ORDER — HEPARIN SODIUM 1000 [USP'U]/ML
3000 INJECTION, SOLUTION INTRAVENOUS; SUBCUTANEOUS ONCE
Status: COMPLETED | OUTPATIENT
Start: 2017-05-19 | End: 2017-05-19

## 2017-05-19 RX ORDER — CLOPIDOGREL BISULFATE 75 MG/1
300 TABLET ORAL
Status: COMPLETED | OUTPATIENT
Start: 2017-05-19 | End: 2017-05-19

## 2017-05-19 RX ORDER — LANOLIN ALCOHOL/MO/W.PET/CERES
1 CREAM (GRAM) TOPICAL
Status: DISCONTINUED | OUTPATIENT
Start: 2017-05-20 | End: 2017-05-20 | Stop reason: HOSPADM

## 2017-05-19 RX ORDER — HEPARIN SODIUM 1000 [USP'U]/ML
3000 INJECTION, SOLUTION INTRAVENOUS; SUBCUTANEOUS ONCE
Status: DISCONTINUED | OUTPATIENT
Start: 2017-05-19 | End: 2017-05-19

## 2017-05-19 RX ORDER — HEPARIN SODIUM 1000 [USP'U]/ML
1000-10000 INJECTION, SOLUTION INTRAVENOUS; SUBCUTANEOUS
Status: DISCONTINUED | OUTPATIENT
Start: 2017-05-19 | End: 2017-05-19 | Stop reason: HOSPADM

## 2017-05-19 RX ORDER — LIDOCAINE HYDROCHLORIDE 10 MG/ML
1-30 INJECTION, SOLUTION EPIDURAL; INFILTRATION; INTRACAUDAL; PERINEURAL
Status: DISCONTINUED | OUTPATIENT
Start: 2017-05-19 | End: 2017-05-19 | Stop reason: HOSPADM

## 2017-05-19 RX ADMIN — DOCUSATE SODIUM 100 MG: 100 CAPSULE, LIQUID FILLED ORAL at 17:18

## 2017-05-19 RX ADMIN — POTASSIUM CHLORIDE 10 MEQ: 10 TABLET, EXTENDED RELEASE ORAL at 17:18

## 2017-05-19 RX ADMIN — SODIUM CHLORIDE 75 ML/HR: 900 INJECTION, SOLUTION INTRAVENOUS at 22:14

## 2017-05-19 RX ADMIN — CHOLECALCIFEROL TAB 25 MCG (1000 UNIT) 5000 UNITS: 25 TAB at 08:31

## 2017-05-19 RX ADMIN — HEPARIN SODIUM 1000 UNITS: 1000 INJECTION, SOLUTION INTRAVENOUS; SUBCUTANEOUS at 12:58

## 2017-05-19 RX ADMIN — FENTANYL CITRATE 25 MCG: 50 INJECTION INTRAMUSCULAR; INTRAVENOUS at 12:43

## 2017-05-19 RX ADMIN — NITROGLYCERIN 0.5 INCH: 20 OINTMENT TOPICAL at 08:33

## 2017-05-19 RX ADMIN — VERAPAMIL HYDROCHLORIDE 2.5 MG: 2.5 INJECTION INTRAVENOUS at 12:45

## 2017-05-19 RX ADMIN — METOPROLOL TARTRATE 12.5 MG: 25 TABLET ORAL at 17:18

## 2017-05-19 RX ADMIN — IOPAMIDOL 70 ML: 612 INJECTION, SOLUTION INTRAVENOUS at 13:51

## 2017-05-19 RX ADMIN — INSULIN LISPRO 2 UNITS: 100 INJECTION, SOLUTION INTRAVENOUS; SUBCUTANEOUS at 17:19

## 2017-05-19 RX ADMIN — ASPIRIN 81 MG CHEWABLE TABLET 81 MG: 81 TABLET CHEWABLE at 08:33

## 2017-05-19 RX ADMIN — NITROGLYCERIN 100 MCG: 5 INJECTION, SOLUTION INTRAVENOUS at 13:42

## 2017-05-19 RX ADMIN — METOPROLOL TARTRATE 12.5 MG: 25 TABLET ORAL at 08:33

## 2017-05-19 RX ADMIN — HEPARIN SODIUM 3000 UNITS: 1000 INJECTION, SOLUTION INTRAVENOUS; SUBCUTANEOUS at 08:30

## 2017-05-19 RX ADMIN — HYDRALAZINE HYDROCHLORIDE 50 MG: 50 TABLET ORAL at 17:18

## 2017-05-19 RX ADMIN — CLOPIDOGREL BISULFATE 75 MG: 75 TABLET ORAL at 08:33

## 2017-05-19 RX ADMIN — NITROGLYCERIN 0.5 INCH: 20 OINTMENT TOPICAL at 17:20

## 2017-05-19 RX ADMIN — AMLODIPINE BESYLATE 10 MG: 10 TABLET ORAL at 08:33

## 2017-05-19 RX ADMIN — HYDRALAZINE HYDROCHLORIDE 50 MG: 50 TABLET ORAL at 08:32

## 2017-05-19 RX ADMIN — HYDRALAZINE HYDROCHLORIDE 50 MG: 50 TABLET ORAL at 22:14

## 2017-05-19 RX ADMIN — POTASSIUM CHLORIDE 10 MEQ: 10 TABLET, EXTENDED RELEASE ORAL at 08:33

## 2017-05-19 RX ADMIN — Medication 10 ML: at 17:20

## 2017-05-19 RX ADMIN — ALLOPURINOL 150 MG: 300 TABLET ORAL at 08:32

## 2017-05-19 RX ADMIN — LEVETIRACETAM 1500 MG: 500 TABLET ORAL at 08:31

## 2017-05-19 RX ADMIN — LIDOCAINE HYDROCHLORIDE 3 ML: 10 INJECTION, SOLUTION EPIDURAL; INFILTRATION; INTRACAUDAL; PERINEURAL at 12:45

## 2017-05-19 RX ADMIN — CLONIDINE HYDROCHLORIDE 0.1 MG: 0.1 TABLET ORAL at 08:31

## 2017-05-19 RX ADMIN — HEPARIN SODIUM 1000 UNITS: 200 INJECTION, SOLUTION INTRAVENOUS at 12:44

## 2017-05-19 RX ADMIN — ISOSORBIDE MONONITRATE 30 MG: 30 TABLET, EXTENDED RELEASE ORAL at 08:32

## 2017-05-19 RX ADMIN — HEPARIN SODIUM 3000 UNITS: 1000 INJECTION, SOLUTION INTRAVENOUS; SUBCUTANEOUS at 13:17

## 2017-05-19 RX ADMIN — VERAPAMIL HYDROCHLORIDE 2.5 MG: 2.5 INJECTION, SOLUTION INTRAVENOUS at 12:45

## 2017-05-19 RX ADMIN — PREDNISONE 5 MG: 10 TABLET ORAL at 08:32

## 2017-05-19 RX ADMIN — CLONIDINE HYDROCHLORIDE 0.1 MG: 0.1 TABLET ORAL at 17:18

## 2017-05-19 RX ADMIN — LEVOTHYROXINE SODIUM 100 MCG: 100 TABLET ORAL at 08:33

## 2017-05-19 RX ADMIN — CLOPIDOGREL 300 MG: 75 TABLET, FILM COATED ORAL at 14:00

## 2017-05-19 RX ADMIN — MIDAZOLAM HYDROCHLORIDE 0.5 MG: 1 INJECTION, SOLUTION INTRAMUSCULAR; INTRAVENOUS at 12:44

## 2017-05-19 RX ADMIN — BIMATOPROST 1 DROP: 0.1 SOLUTION/ DROPS OPHTHALMIC at 19:55

## 2017-05-19 RX ADMIN — NITROGLYCERIN 200 MCG: 5 INJECTION, SOLUTION INTRAVENOUS at 12:45

## 2017-05-19 RX ADMIN — ROSUVASTATIN CALCIUM 40 MG: 20 TABLET, FILM COATED ORAL at 22:14

## 2017-05-19 RX ADMIN — LEVETIRACETAM 1500 MG: 500 TABLET ORAL at 17:19

## 2017-05-19 NOTE — ROUTINE PROCESS
TRANSFER - IN REPORT:    Verbal report received from [place], RN(name) on [employer].  being received from [facility](unit) for routine progression of care      Report consisted of patients Situation, Background, Assessment and   Recommendations(SBAR). Information from the following report(s) SBAR and Procedure Summary was reviewed with the receiving nurse. Opportunity for questions and clarification was provided. Assessment completed upon patients arrival to unit and care assumed.

## 2017-05-19 NOTE — PROGRESS NOTES
Progress Note    Kevin Fulton.  66 y.o. Admit Date: 5/13/2017  Principal Problem:    Heme positive stool (5/17/2017) POA: Yes    Active Problems:    NSTEMI (non-ST elevated myocardial infarction) ([facility] [address] (5/13/2017) POA: Unknown      Chronic kidney disease, stage III (moderate) (5/16/2017) POA: Unknown      Anemia (5/16/2017) POA: Unknown      Hypertension (5/16/2017) POA: Unknown            Subjective:     Patient feels good, no chest pain ,NOSOB, decided to do cardiac cath , will be done  Today,, had extensive discussion  Last night & this morning regarding Pros & cons of doing cardiac  Cath, finally he decided to proceed with cardiac catherization. Understands the risk of Kidney damage  From the effect of Contrast.      A comprehensive review of systems was negative except for that written in the History of Present Illness.     Objective:     Visit Vitals    /80 (BP 1 Location: Left arm, BP Patient Position: At rest)    Pulse 60    Temp 97.8 °F (36.6 °C)    Resp 18    Ht 5' 7\" (1.702 m)    Wt 128 kg (282 lb 3.2 oz)    SpO2 97%    BMI 44.2 kg/m2         Intake/Output Summary (Last 24 hours) at 05/19/17 1151  Last data filed at 05/19/17 0946   Gross per 24 hour   Intake              300 ml   Output             1000 ml   Net             -700 ml       Current Facility-Administered Medications   Medication Dose Route Frequency Provider Last Rate Last Dose    0.9% sodium chloride infusion  50 mL/hr IntraVENous CONTINUOUS Kristina Kirby  mL/hr at 05/18/17 2152 100 mL/hr at 05/18/17 2152    heparin 25,000 units in D5W 250 ml infusion  8.16-25 Units/kg/hr IntraVENous [address] [place] 12 mL/hr at 05/19/17 0804 1,200 Units/hr at 05/19/17 0804    rosuvastatin (CRESTOR) tablet 40 mg  40 mg Oral QHS Manda Anderson MD   40 mg at 05/18/17 2151    cloNIDine HCl (CATAPRES) tablet 0.1 mg  0.1 mg Oral BID Manda Anderson MD   0.1 mg at 05/19/17 0831    metoprolol tartrate (LOPRESSOR) tablet 12.5 mg  12.5 mg Oral BID Karly Khan MD   12.5 mg at 05/19/17 4902    hydrALAZINE (APRESOLINE) tablet 50 mg  50 mg Oral TID Karly Khan MD   50 mg at 05/19/17 9766    amLODIPine (NORVASC) tablet 10 mg  10 mg Oral DAILY Karly Khan MD   10 mg at 05/19/17 8131    allopurinol (ZYLOPRIM) tablet 150 mg  150 mg Oral DAILY Becki Walker III, MD   150 mg at 05/19/17 4758    cholecalciferol (VITAMIN D3) tablet 5,000 Units  5,000 Units Oral DAILY Becki Walker III, MD   5,000 Units at 92/25/19 3300    folic acid-vit F2-RVF F79 (FOLTX) 2.5-25-2 mg tablet 1 Tab  1 Tab Oral DAILY Becki Walker III, MD   1 Tab at 05/18/17 0900    dutasteride (AVODART) capsule 0.5 mg  0.5 mg Oral DAILY Becki Walker III, MD   0.5 mg at 05/18/17 1257    isosorbide mononitrate ER (IMDUR) tablet 30 mg  30 mg Oral DAILY Becki Walker III, MD   30 mg at 05/19/17 8234    levETIRAcetam (KEPPRA) tablet 1,500 mg  1,500 mg Oral BID Becki Walker III, MD   1,500 mg at 05/19/17 0831    levothyroxine (SYNTHROID) tablet 100 mcg  100 mcg Oral ACB Becki Walker III, MD   100 mcg at 05/19/17 8984    potassium chloride (KLOR-CON) tablet 10 mEq  10 mEq Oral BID Becki Walker III, MD   10 mEq at 05/19/17 1520    predniSONE (DELTASONE) tablet 5 mg  5 mg Oral DAILY Becki Walker III, MD   5 mg at 05/19/17 0832    bimatoprost (LUMIGAN) 0.01 % ophthalmic drops 1 Drop  1 Drop Ophthalmic QPM Becki Walker III, MD   1 Drop at 05/18/17 1800    magnesium hydroxide (MILK OF MAGNESIA) 400 mg/5 mL oral suspension 30 mL  30 mL Oral DAILY PRN Becki Walker III, MD        docusate sodium (COLACE) capsule 100 mg  100 mg Oral BID Becki Walker III, MD   Stopped at 05/19/17 0900    acetaminophen (TYLENOL) tablet 650 mg  650 mg Oral Q6H PRN Becki Walker III, MD        morphine injection 2 mg  2 mg IntraVENous Q4H PRN Becki Walker III, MD        nitroglycerin (NITROSTAT) tablet 0.4 mg  0.4 mg SubLINGual Q5MIN PRN Gabe Drain III, MD        aspirin chewable tablet 81 mg  81 mg Oral DAILY Gabe Drain MD CARMEN   81 mg at 05/19/17 6714    clopidogrel (PLAVIX) tablet 75 mg  75 mg Oral DAILY Gabe Drain MD CARMEN   75 mg at 05/19/17 0833    ondansetron (ZOFRAN) injection 4 mg  4 mg IntraVENous Q4H PRN Gabe Drain III, MD        naloxone [facility]) injection 0.4 mg  0.4 mg IntraVENous PRN Gabe Drain III, MD        diphenhydrAMINE (BENADRYL) injection 12.5 mg  12.5 mg IntraVENous Q4H PRN Gabe Drain III, MD        insulin lispro (HUMALOG) injection   SubCUTAneous AC&HS Logan Every, MD   Stopped at 05/18/17 2200    glucose chewable tablet 16 g  16 g Oral PRN Logan Every, MD        glucagon (GLUCAGEN) injection 1 mg  1 mg IntraMUSCular PRN Olgan Every, MD        dextrose (D50W) injection syrg 12.5-25 g  25-50 mL IntraVENous PRN Logan Every, MD        nitroglycerin (NITROBID) 2 % ointment 0.5 Inch  0.5 Inch Topical BID Diannah Patches, MD   0.5 Inch at 05/19/17 4018        Physical Exam:     Physical Exam:   General:  Alert, cooperative, no distress, appears stated age. Neck: Supple, symmetrical, trachea midline, no adenopathy, thyroid: no enlargement/tenderness/nodules, no carotid bruit and no JVD. Lungs:   Clear to auscultation bilaterally. Heart:  Regular rate and rhythm, S1, S2 normal, no murmur, click, rub or gallop. Abdomen:   Soft, non-tender. Bowel sounds normal. No masses,  No organomegaly.    Extremities: Extremities normal, atraumatic, no cyanosis ,has  edema   Skin: Skin color, texture, turgor normal. No rashes or lesions         Data Review:    CBC w/Diff    Recent Labs      05/19/17   0341  05/17/17   0210   WBC  7.8  8.7   RBC  3.11*  3.25*   HGB  8.1*  8.6*   HCT  25.0*  26.5*   MCV  80.4  81.5   MCH  26.0  26.5   MCHC  32.4  32.5   RDW  16.0*  15.9*    Recent Labs      05/19/17   0341  05/17/17   0210   MONOS  4  3   EOS  3  2   BASOS  0  0   RDW  16.0*  15.9* Comprehensive Metabolic Profile    Recent Labs      05/19/17   0341  05/18/17   1015  05/18/17   0040   NA  143  146*  143   K  4.8  4.5  5.1   CL  114*  115*  114*   CO2  23  24  22   BUN  44*  46*  50*   CREA  2.09*  2.11*  2.17*    Recent Labs      05/19/17   0341  05/18/17   1015  05/18/17   0040   CA  8.5  8.6  8.3*                        Impression:       Active Hospital Problems    Diagnosis Date Noted    Heme positive stool 05/17/2017    Chronic kidney disease, stage III (moderate) 05/16/2017    Anemia 05/16/2017    Hypertension 05/16/2017    NSTEMI (non-ST elevated myocardial infarction) ([facility] [address] 05/13/2017            Plan:     Proceed with cardiac cath as discussed , use minimum contrast, Hydrate before & after catheterization, has Hem positive stool,low HB, will start Iron,follow lab, anemia work up.       Azalea Gonzalez MD

## 2017-05-19 NOTE — ROUTINE PROCESS
1900-Bedside shift change report given to Agustina Holt Rn (oncoming nurse) by Arianna Feng RN (offgoing nurse). Report included the following information SBAR, Kardex and MAR.

## 2017-05-19 NOTE — ROUTINE PROCESS
Bedside and Verbal shift change report given to Arely Cody RN  (oncoming nurse) by Shari Foster  (offgoing nurse).  Report included the following information SBAR, Kardex, ED Summary, Procedure Summary, Intake/Output, MAR, Recent Results, Med Rec Status and Cardiac Rhythm SB.

## 2017-05-19 NOTE — PROGRESS NOTES
[facility]      CARDIOLOGY PROGRESS NOTE  RECS:    1. Acute non-ST elevation myocardial infarction - currently chest pain free on heparin drip. Echo revealed preserved EF%. Plans for LHC today. Discussed with patient and Nephrology   2. Hypertension- better control today continue  hydralazine & Norvasc. Add clonidine  3. Hyperlipidemia- on statin with elevated LDL. Change lipitor to crestor. .  4. Diabetes, treatment per medical team.  5. Severe obesity with sleep apnea on CPAP. 6. Renal insufficiency, which may be advanced. -continue IVF as renal function improving. Monitor for fluid overload   Nephrology ok for LHC  7. Anemia-stable H&H      The benefits and risks of cardiac catheterization have been discussed in detail with the patient present at the time. Patient understands risk of potential cath complications including but not limited to bleeding, infection, difficulty healing the arteriotomy access site(2 chances in 100) which may require surgical repair, potential thromboembolic complications which could result in stroke, myocardial infarction, loss of limb or organ function and/or death( 1 chance in 1000)and potential allergic reaction to contrast dye or other medication used during the procedure. Patient is also aware of the therapeutic implications for medical management vs coronary artery bypass surgery vs percutaneous coronary intervention in treatment of coronary artery disease. The additional risks for percutaneous coronary artery intervention include the need for emergent bypass surgery at 1 chance in 100 and for restenosis which may range from 35% for plain balloon angioplasty, 15% for bare metal stent, and 5% for drug eluting stent implants.  The need for mandatory uninterrupted dual antiplatelet therapy with lifelong Aspirin combined with Plavix for up to 12 months following drug eluting stents, and minimum 1 month following bare metal stents to prevent stent thrombosis which is the equivalent of acute heart attack has been reviewed in detail. No contraindications to use of drug eluting stents has been discovered.       Had lenghty discussion with patient and patient's family regarding LHC/medical management. Now agree to proceed with LHC. Adequate hydration provided-- but renal complication exists. ASSESSMENT:  Hospital Problems  Date Reviewed: 5/17/2017          Codes Class Noted POA    * (Principal)Heme positive stool ICD-10-CM: R19.5  ICD-9-CM: 792.1  5/17/2017 Yes        Chronic kidney disease, stage III (moderate) ICD-10-CM: N18.3  ICD-9-CM: 585.3  5/16/2017 Unknown        Anemia ICD-10-CM: D64.9  ICD-9-CM: 285.9  5/16/2017 Unknown        Hypertension ICD-10-CM: I10  ICD-9-CM: 401.9  5/16/2017 Unknown        NSTEMI (non-ST elevated myocardial infarction) [facility]) ICD-10-CM: I21.4  ICD-9-CM: 410.70  5/13/2017 Unknown                SUBJECTIVE:    No CP or SOB  Feels good today. OBJECTIVE:    VS:   Visit Vitals    /80 (BP 1 Location: Left arm, BP Patient Position: At rest)    Pulse 60    Temp 97.8 °F (36.6 °C)    Resp 18    Ht 5' 7\" (1.702 m)    Wt 128 kg (282 lb 3.2 oz)    SpO2 97%    BMI 44.2 kg/m2         Intake/Output Summary (Last 24 hours) at 05/19/17 1115  Last data filed at 05/19/17 0946   Gross per 24 hour   Intake              300 ml   Output             1000 ml   Net             -700 ml     TELE: normal sinus rhythm    General: in no apparent distress  HENT: Normocephalic, atraumatic. Normal external eye.   Neck :  JVD difficult to assess due to obesity  Cardiac:  regular rate and rhythm, S1, S2 normal, no murmur, click, rub or gallop  Lungs: clear to auscultation bilaterally  Abdomen: Soft, distended  , no masses  Extremities:  Mild 1+edema ble     Labs: Results:       Chemistry Recent Labs      05/19/17   0341  05/18/17   1015  05/18/17   0040   GLU  90  114*  144*   NA  143  146*  143   K  4.8  4.5  5.1   CL  114*  115*  114*   CO2 23  24  22   BUN  44*  46*  50*   CREA  2.09*  2.11*  2.17*   CA  8.5  8.6  8.3*   AGAP  6  7  7   BUCR  21*  22*  23*      CBC w/Diff Recent Labs      05/19/17   0341  05/17/17   0210   WBC  7.8  8.7   RBC  3.11*  3.25*   HGB  8.1*  8.6*   HCT  25.0*  26.5*   PLT  185  199   GRANS  75*  76*   LYMPH  18*  19*   EOS  3  2      Cardiac Enzymes No results for input(s): CPK, CKND1, AHSAN in the last 72 hours. No lab exists for component: CKRMB, TROIP   Coagulation Recent Labs      05/19/17   0633  05/19/17   0300   APTT  50.1*  45.1*       Lipid Panel Lab Results   Component Value Date/Time    Cholesterol, total 203 05/15/2017 05:05 AM    HDL Cholesterol 62 05/15/2017 05:05 AM    LDL, calculated 121.8 05/15/2017 05:05 AM    VLDL, calculated 19.2 05/15/2017 05:05 AM    Triglyceride 96 05/15/2017 05:05 AM    CHOL/HDL Ratio 3.3 05/15/2017 05:05 AM      BNP No results for input(s): BNPP in the last 72 hours. Liver Enzymes No results for input(s): TP, ALB, TBIL, AP, SGOT, GPT in the last 72 hours. No lab exists for component: DBIL   Thyroid Studies Lab Results   Component Value Date/Time    TSH 0.38 05/14/2017 07:51 AM              Rajesh Bennett NP   [phone] supervised. I have independently evaluated and examined the patient. All relevant labs and testing data's are reviewed. Care plan discussed and updated after review.     Elizabeth Gandhi MD

## 2017-05-19 NOTE — PROGRESS NOTES
[facility]  [facility]    Patient: Christiano Walker. Age: 66 y.o. : 1945 MR#: [id number] SSN: [id number]  Date/Time: 2017     Subjective:     Review of systems  General: No fevers or chills. Cardiovascular: No chest pain or pressure. No palpitations. Pulmonary: No shortness of breath, cough or wheeze. Gastrointestinal: No abdominal pain, nausea, vomiting or diarrhea. Genitourinary: No urinary frequency, urgency, hesitancy or dysuria. Musculoskeletal: No joint or muscle pain, no back pain, no recent trauma. Neurologic: No headache, numbness, tingling or weakness. Assessment/Plan:   1.  NSTEMI   2 CKD 3   3 HTN  4 HLD   5 MIGNON     PLAN   - Patient with CAD , abnormal Stress Test   - S/p Cath - s/p Stent - see cardiology note for detail   - D/w patient , Family and Cardiology   - patient will be discharge in AM if stable , he plans to go back to his cardiology and PCP  ( out of [place] )   - He is instructed follow up on his Labs           Case discussed with:  [x]Patient  [x]Family  []Nursing  []Case Management  DVT Prophylaxis:  []Lovenox  []Hep SQ  []SCDs  []Coumadin   [x]On Heparin gtt    Patient Active Problem List   Diagnosis Code    [facility] I[facility]    Chronic kidney disease, stage III (moderate) N18.3    Anemia D64.9    Hypertension I10    Heme positive stool R19.5       Objective:   VS:   Visit Vitals    /71    Pulse 62    Temp 98.2 °F (36.8 °C)    Resp 21    Ht 5' 7\" (1.702 m)    Wt 128 kg (282 lb 3.2 oz)    SpO2 98%    BMI 44.2 kg/m2      Tmax/24hrs: Temp (24hrs), Av.7 °F (36.5 °C), Min:97.3 °F (36.3 °C), Max:98.2 °F (36.8 °C)  IOBRIEF  Intake/Output Summary (Last 24 hours) at 17 1743  Last data filed at 17 1740   Gross per 24 hour   Intake          2557.92 ml   Output             1100 ml   Net          1457.92 ml       General:  Alert, cooperative, no acute distress   HEENT:  NC, Atraumatic. PERRLA, anicteric sclerae. Pulmonary:  CTA Bilaterally. No Wheezing/Rhonchi/Rales. Cardiovascular: Regular rate and Rhythm. GI:  Soft, Non distended, Non tender. + Bowel sounds. Extremities:  No edema, cyanosis, clubbing. No calf tenderness. Psych:  Not anxious or agitated. Neurologic: Grossly - Motor and Sensory functions are intact .  No Anxiety , calm , no Agitation         Medications:   Current Facility-Administered Medications   Medication Dose Route Frequency    [START ON 5/20/2017] ferrous sulfate tablet 325 mg  1 Tab Oral DAILY WITH BREAKFAST    sodium chloride (NS) flush 5-10 mL  5-10 mL IntraVENous Q8H    sodium chloride (NS) flush 5-10 mL  5-10 mL IntraVENous PRN    0.9% sodium chloride infusion  75 mL/hr IntraVENous CONTINUOUS    rosuvastatin (CRESTOR) tablet 40 mg  40 mg Oral QHS    cloNIDine HCl (CATAPRES) tablet 0.1 mg  0.1 mg Oral BID    metoprolol tartrate (LOPRESSOR) tablet 12.5 mg  12.5 mg Oral BID    hydrALAZINE (APRESOLINE) tablet 50 mg  50 mg Oral TID    amLODIPine (NORVASC) tablet 10 mg  10 mg Oral DAILY    allopurinol (ZYLOPRIM) tablet 150 mg  150 mg Oral DAILY    cholecalciferol (VITAMIN D3) tablet 5,000 Units  5,000 Units Oral DAILY    folic acid-vit H5-NJR P26 (FOLTX) 2.5-25-2 mg tablet 1 Tab  1 Tab Oral DAILY    dutasteride (AVODART) capsule 0.5 mg  0.5 mg Oral DAILY    isosorbide mononitrate ER (IMDUR) tablet 30 mg  30 mg Oral DAILY    levETIRAcetam (KEPPRA) tablet 1,500 mg  1,500 mg Oral BID    levothyroxine (SYNTHROID) tablet 100 mcg  100 mcg Oral ACB    potassium chloride (KLOR-CON) tablet 10 mEq  10 mEq Oral BID    predniSONE (DELTASONE) tablet 5 mg  5 mg Oral DAILY    bimatoprost (LUMIGAN) 0.01 % ophthalmic drops 1 Drop  1 Drop Ophthalmic QPM    magnesium hydroxide (MILK OF MAGNESIA) 400 mg/5 mL oral suspension 30 mL  30 mL Oral DAILY PRN    docusate sodium (COLACE) capsule 100 mg  100 mg Oral BID    acetaminophen (TYLENOL) tablet 650 mg  650 mg Oral Q6H PRN    morphine injection 2 mg  2 mg IntraVENous Q4H PRN    nitroglycerin (NITROSTAT) tablet 0.4 mg  0.4 mg SubLINGual Q5MIN PRN    aspirin chewable tablet 81 mg  81 mg Oral DAILY    clopidogrel (PLAVIX) tablet 75 mg  75 mg Oral DAILY    ondansetron (ZOFRAN) injection 4 mg  4 mg IntraVENous Q4H PRN    naloxone (NARCAN) injection 0.4 mg  0.4 mg IntraVENous PRN    diphenhydrAMINE (BENADRYL) injection 12.5 mg  12.5 mg IntraVENous Q4H PRN    insulin lispro (HUMALOG) injection   SubCUTAneous AC&HS    glucose chewable tablet 16 g  16 g Oral PRN    glucagon (GLUCAGEN) injection 1 mg  1 mg IntraMUSCular PRN    dextrose (D50W) injection syrg 12.5-25 g  25-50 mL IntraVENous PRN    nitroglycerin (NITROBID) 2 % ointment 0.5 Inch  0.5 Inch Topical BID       Labs:    Recent Results (from the past 24 hour(s))   GLUCOSE, POC    Collection Time: 05/18/17  5:59 PM   Result Value Ref Range    Glucose (POC) 170 (H) 70 - 110 mg/dL   GLUCOSE, POC    Collection Time: 05/18/17  9:38 PM   Result Value Ref Range    Glucose (POC) 127 (H) 70 - 110 mg/dL   PTT    Collection Time: 05/19/17  3:00 AM   Result Value Ref Range    aPTT 45.1 (H) 23.0 - 25.3 SEC   METABOLIC PANEL, BASIC    Collection Time: 05/19/17  3:41 AM   Result Value Ref Range    Sodium 143 136 - 145 mmol/L    Potassium 4.8 3.5 - 5.5 mmol/L    Chloride 114 (H) 100 - 108 mmol/L    CO2 23 21 - 32 mmol/L    Anion gap 6 3.0 - 18 mmol/L    Glucose 90 74 - 99 mg/dL    BUN 44 (H) 7.0 - 18 MG/DL    Creatinine 2.09 (H) 0.6 - 1.3 MG/DL    BUN/Creatinine ratio 21 (H) 12 - 20      GFR est AA 38 (L) >60 ml/min/1.73m2    GFR est non-AA 31 (L) >60 ml/min/1.73m2    Calcium 8.5 8.5 - 10.1 MG/DL   CBC WITH AUTOMATED DIFF    Collection Time: 05/19/17  3:41 AM   Result Value Ref Range    WBC 7.8 4.6 - 13.2 K/uL    RBC 3.11 (L) 4.70 - 5.50 M/uL    HGB 8.1 (L) 13.0 - 16.0 g/dL    HCT 25.0 (L) 36.0 - 48.0 %    MCV 80.4 74.0 - 97.0 FL    MCH 26.0 24.0 - 34.0 PG    MCHC 32.4 31.0 - 37.0 g/dL    RDW 16.0 (H) 11.6 - 14.5 %    PLATELET 908 416 - 576 K/uL    MPV 10.8 9.2 - 11.8 FL    NEUTROPHILS 75 (H) 40 - 73 %    LYMPHOCYTES 18 (L) 21 - 52 %    MONOCYTES 4 3 - 10 %    EOSINOPHILS 3 0 - 5 %    BASOPHILS 0 0 - 2 %    ABS. NEUTROPHILS 5.9 1.8 - 8.0 K/UL    ABS. LYMPHOCYTES 1.4 0.9 - 3.6 K/UL    ABS. MONOCYTES 0.3 0.05 - 1.2 K/UL    ABS. EOSINOPHILS 0.2 0.0 - 0.4 K/UL    ABS.  BASOPHILS 0.0 0.0 - 0.1 K/UL    DF AUTOMATED     PTT    Collection Time: 05/19/17  6:33 AM   Result Value Ref Range    aPTT 50.1 (H) 23.0 - 36.4 SEC   GLUCOSE, POC    Collection Time: 05/19/17  7:20 AM   Result Value Ref Range    Glucose (POC) 84 70 - 110 mg/dL   GLUCOSE, POC    Collection Time: 05/19/17 11:23 AM   Result Value Ref Range    Glucose (POC) 101 70 - 110 mg/dL   CBC W/O DIFF    Collection Time: 05/19/17 12:49 PM   Result Value Ref Range    WBC 10.4 4.6 - 13.2 K/uL    RBC 3.14 (L) 4.70 - 5.50 M/uL    HGB 8.3 (L) 13.0 - 16.0 g/dL    HCT 25.3 (L) 36.0 - 48.0 %    MCV 80.6 74.0 - 97.0 FL    MCH 26.4 24.0 - 34.0 PG    MCHC 32.8 31.0 - 37.0 g/dL    RDW 16.2 (H) 11.6 - 14.5 %    PLATELET 862 062 - 551 K/uL    MPV 10.7 9.2 - 11.8 FL   POC ACTIVATED CLOTTING TIME    Collection Time: 05/19/17  1:11 PM   Result Value Ref Range    Activated Clotting Time (POC) 168 (H) 79 - 138 SECS   POC ACTIVATED CLOTTING TIME    Collection Time: 05/19/17  1:27 PM   Result Value Ref Range    Activated Clotting Time (POC) 198 (H) 79 - 138 SECS   POC ACTIVATED CLOTTING TIME    Collection Time: 05/19/17  1:49 PM   Result Value Ref Range    Activated Clotting Time (POC) 204 (H) 79 - 138 SECS   EKG, 12 LEAD, INITIAL    Collection Time: 05/19/17  2:35 PM   Result Value Ref Range    Ventricular Rate 57 BPM    Atrial Rate 57 BPM    P-R Interval 196 ms    QRS Duration 84 ms    Q-T Interval 438 ms    QTC Calculation (Bezet) 426 ms    Calculated P Axis 71 degrees    Calculated R Axis 21 degrees    Calculated T Axis 131 degrees    Diagnosis       Sinus bradycardia  T wave abnormality, consider lateral ischemia  Abnormal ECG  When compared with ECG of 17-MAY-2017 08:19,  T wave inversion no longer evident in Inferior leads  Confirmed by Marlee Bateman MD ([id number]),  Jessica Wilson ([id number]) on   5/19/2017 3:10:51 PM     GLUCOSE, POC    Collection Time: 05/19/17  5:14 PM   Result Value Ref Range    Glucose (POC) 191 (H) 70 - 110 mg/dL       Signed By: Celestina Stokes MD     May 19, 2017

## 2017-05-19 NOTE — ROUTINE PROCESS
TRANSFER - IN REPORT:    Verbal report received from Truong Boyd(name) on [employer].  being received from cath lab(unit) for routine progression of care      Report consisted of patients Situation, Background, Assessment and   Recommendations(SBAR). Information from the following report(s) SBAR, Kardex, Procedure Summary and Cardiac Rhythm SB was reviewed with the receiving nurse. Opportunity for questions and clarification was provided. Assessment completed upon patients arrival to unit and care assumed.

## 2017-05-19 NOTE — PROGRESS NOTES
Hospitalist Progress Note    Patient: Estefanía Kirby MRN: [id number]  CSN: [id number]    YOB: 1945  Age: 66 y.o. Sex: male    DOA: 5/13/2017 LOS:  LOS: 5 days          Creatinine down to 2. 11. Extensive discussion held at bedside with patient and his wife, Dr Cherry Lane and Dr. Carly Badillo. Patient states he will think about whether he wants to obtain cath here or return to [facility]. He and his wife express understanding that if he returns to [facility], will be considered AMA but he will need rx for plavix. Assessment/Plan     1. NSTEMI - on medical management. Stress test noted. Possible cath at the discretion of cardiology, pending improvement in renal function. 2. Hypertension - diuretics held, continue current regimen, cards following. 3. dyslipidemia. On Lipitor. 4. DM2 - ssi, A1c 8.6. Diabetes educator worked with him. 5. oksana - home cpap (ordered). 6. Elevated creatinine - unknown baseline - no records available electronically in cc - nephrology following. 7. Leukocytosis improved - likely reactive in the setting of MI.  8. Stool + for occult blood - GI recs noted. 9. Elevated D-dimer: CTA chest not done secondary to his renal failure. on full-dose heparin. VQ: Low probability ventilation perfusion scan. 10. Morbid obesity Body mass index is 43.38 kg/(m^2). 11. Full code. tele. Additional Notes:      Case discussed with:  [x]Patient  [x]Family  [x]Nursing  []Case Management  DVT Prophylaxis:  []Lovenox  []Hep SQ  []SCDs  []Coumadin   [x]On Heparin gtt    Vital signs/Intake and Output:  Visit Vitals    /71 (BP 1 Location: Left arm, BP Patient Position: At rest)    Pulse (!) 51    Temp 97.8 °F (36.6 °C)    Resp 18    Ht 5' 7\" (1.702 m)    Wt 125.6 kg (277 lb)    SpO2 100%    BMI 43.38 kg/m2     Current Shift:     Last three shifts:  05/17 0701 - 05/18 1900  In: 1700 [P.O.:1700]  Out: 1400 [Urine:1400]    Awake alert and oriented. Obese.  Sitting up in chair. Wife at bedside. Ncat. perrl  RRR  cta b.l  Obese soft nt nd nabs  No edema  No focal deficit  No rash    Medications Reviewed      Labs: Results:       Chemistry Recent Labs      05/18/17   1015  05/18/17   0040  05/17/17   0210  05/16/17   0504   GLU  114*  144*  100*  96   NA  146*  143  144  144   K  4.5  5.1  4.6  4.1   CL  115*  114*  114*  111*   CO2  24  22  26  25   BUN  46*  50*  60*  76*   CREA  2.11*  2.17*  2.33*  2.52*   CA  8.6  8.3*  8.8  8.9  8.6   AGAP  7  7  4  8   BUCR  22*  23*  26*  30*   AP   --    --    --   62   TP   --    --    --   6.1*   ALB   --    --    --   2.5*   GLOB   --    --    --   3.6   AGRAT   --    --    --   0.7*      CBC w/Diff Recent Labs      05/17/17   0210  05/16/17   0504   WBC  8.7  9.5   RBC  3.25*  3.24*   HGB  8.6*  8.5*   HCT  26.5*  26.1*   PLT  199  215   GRANS  76*  71   LYMPH  19*  23   EOS  2  2      Cardiac Enzymes No results for input(s): CPK, CKND1, AHSAN in the last 72 hours. No lab exists for component: CKRMB, TROIP   Coagulation Recent Labs      05/18/17   1015  05/18/17   0640   APTT  111.0*  128.7*       Lipid Panel Lab Results   Component Value Date/Time    Cholesterol, total 203 05/15/2017 05:05 AM    HDL Cholesterol 62 05/15/2017 05:05 AM    LDL, calculated 121.8 05/15/2017 05:05 AM    VLDL, calculated 19.2 05/15/2017 05:05 AM    Triglyceride 96 05/15/2017 05:05 AM    CHOL/HDL Ratio 3.3 05/15/2017 05:05 AM      BNP No results for input(s): BNPP in the last 72 hours. Liver Enzymes Recent Labs      05/16/17   0504   TP  6.1*   ALB  2.5*   AP  62   SGOT  19      Thyroid Studies Lab Results   Component Value Date/Time    TSH 0.38 05/14/2017 07:51 AM        Procedures/imaging: see electronic medical records for all procedures/Xrays and details which were not copied into this note but were reviewed prior to creation of Plan.

## 2017-05-20 VITALS
DIASTOLIC BLOOD PRESSURE: 61 MMHG | BODY MASS INDEX: 44.84 KG/M2 | OXYGEN SATURATION: 98 % | RESPIRATION RATE: 31 BRPM | HEIGHT: 67 IN | TEMPERATURE: 97.7 F | SYSTOLIC BLOOD PRESSURE: 144 MMHG | WEIGHT: 285.7 LBS | HEART RATE: 64 BPM

## 2017-05-20 LAB
GLUCOSE BLD STRIP.AUTO-MCNC: 113 MG/DL (ref 70–110)
GLUCOSE BLD STRIP.AUTO-MCNC: 79 MG/DL (ref 70–110)

## 2017-05-20 PROCEDURE — 77010033678 HC OXYGEN DAILY

## 2017-05-20 PROCEDURE — 74011250637 HC RX REV CODE- 250/637: Performed by: HOSPITALIST

## 2017-05-20 PROCEDURE — 74011250637 HC RX REV CODE- 250/637: Performed by: INTERNAL MEDICINE

## 2017-05-20 PROCEDURE — 74011636637 HC RX REV CODE- 636/637: Performed by: HOSPITALIST

## 2017-05-20 PROCEDURE — 82962 GLUCOSE BLOOD TEST: CPT

## 2017-05-20 PROCEDURE — 74011250637 HC RX REV CODE- 250/637: Performed by: EMERGENCY MEDICINE

## 2017-05-20 RX ORDER — CLOPIDOGREL BISULFATE 75 MG/1
75 TABLET ORAL DAILY
Qty: 30 TAB | Refills: 0 | Status: SHIPPED | OUTPATIENT
Start: 2017-05-20

## 2017-05-20 RX ORDER — ROSUVASTATIN CALCIUM 40 MG/1
40 TABLET, COATED ORAL
Qty: 30 TAB | Refills: 0 | Status: SHIPPED | OUTPATIENT
Start: 2017-05-20

## 2017-05-20 RX ORDER — AMLODIPINE BESYLATE 5 MG/1
10 TABLET ORAL DAILY
Qty: 20 TAB | Refills: 0 | Status: SHIPPED | OUTPATIENT
Start: 2017-05-20

## 2017-05-20 RX ORDER — METOPROLOL TARTRATE 25 MG/1
12.5 TABLET, FILM COATED ORAL 2 TIMES DAILY
Qty: 60 TAB | Refills: 0 | Status: SHIPPED | OUTPATIENT
Start: 2017-05-20

## 2017-05-20 RX ADMIN — CLONIDINE HYDROCHLORIDE 0.1 MG: 0.1 TABLET ORAL at 08:53

## 2017-05-20 RX ADMIN — METOPROLOL TARTRATE 12.5 MG: 25 TABLET ORAL at 08:53

## 2017-05-20 RX ADMIN — ASPIRIN 81 MG CHEWABLE TABLET 81 MG: 81 TABLET CHEWABLE at 08:54

## 2017-05-20 RX ADMIN — ALLOPURINOL 150 MG: 300 TABLET ORAL at 08:54

## 2017-05-20 RX ADMIN — DUTASTERIDE 0.5 MG: 0.5 CAPSULE, LIQUID FILLED ORAL at 09:08

## 2017-05-20 RX ADMIN — CHOLECALCIFEROL TAB 25 MCG (1000 UNIT) 5000 UNITS: 25 TAB at 08:53

## 2017-05-20 RX ADMIN — LEVOTHYROXINE SODIUM 100 MCG: 100 TABLET ORAL at 08:54

## 2017-05-20 RX ADMIN — PREDNISONE 5 MG: 10 TABLET ORAL at 09:09

## 2017-05-20 RX ADMIN — Medication 1 TABLET: at 08:54

## 2017-05-20 RX ADMIN — CLOPIDOGREL BISULFATE 75 MG: 75 TABLET ORAL at 08:54

## 2017-05-20 RX ADMIN — AMLODIPINE BESYLATE 10 MG: 10 TABLET ORAL at 08:54

## 2017-05-20 RX ADMIN — ISOSORBIDE MONONITRATE 30 MG: 30 TABLET, EXTENDED RELEASE ORAL at 08:53

## 2017-05-20 RX ADMIN — DOCUSATE SODIUM 100 MG: 100 CAPSULE, LIQUID FILLED ORAL at 08:53

## 2017-05-20 RX ADMIN — FERROUS SULFATE TAB 325 MG (65 MG ELEMENTAL FE) 325 MG: 325 (65 FE) TAB at 08:53

## 2017-05-20 RX ADMIN — LEVETIRACETAM 1500 MG: 500 TABLET ORAL at 08:53

## 2017-05-20 RX ADMIN — NITROGLYCERIN 0.5 INCH: 20 OINTMENT TOPICAL at 09:09

## 2017-05-20 RX ADMIN — POTASSIUM CHLORIDE 10 MEQ: 10 TABLET, EXTENDED RELEASE ORAL at 09:09

## 2017-05-20 RX ADMIN — HYDRALAZINE HYDROCHLORIDE 50 MG: 50 TABLET ORAL at 08:54

## 2017-05-20 NOTE — ROUTINE PROCESS
Patient prepared for discharge. Discharge instructions and prescriptions reviewed with patient and with patient personal belongings packed up by this nurse and given to patient. Patient noted to have a home Cpap machine that does not have its original carrying case and was packed up with its tubing and mask and placed in a plastic patient belonging bag.

## 2017-05-20 NOTE — PROGRESS NOTES
Progress Note    Donalda Gosselin.  66 y.o. Admit Date: 5/13/2017  Principal Problem:    Heme positive stool (5/17/2017) POA: Yes    Active Problems:    NSTEMI (non-ST elevated myocardial infarction) ([address] (5/13/2017) POA: Unknown      Chronic kidney disease, stage III (moderate) (5/16/2017) POA: Unknown      Anemia (5/16/2017) POA: Unknown      Hypertension (5/16/2017) POA: Unknown            Subjective:     Patient feels good, no SOB, no chest pain, making good Volume of Urine, off diuretics & ARB. Undergone Cardiac cath & has angioplasty  & stent. A comprehensive review of systems was negative except for that written in the History of Present Illness.     Objective:     Visit Vitals    /53    Pulse (!) 58    Temp 98.2 °F (36.8 °C)    Resp 23    Ht 5' 7\" (1.702 m)    Wt 129.6 kg (285 lb 11.2 oz)    SpO2 99%    BMI 44.75 kg/m2         Intake/Output Summary (Last 24 hours) at 05/20/17 1129  Last data filed at 05/20/17 0800   Gross per 24 hour   Intake          3807.92 ml   Output             1450 ml   Net          2357.92 ml       Current Facility-Administered Medications   Medication Dose Route Frequency Provider Last Rate Last Dose    ferrous sulfate tablet 325 mg  1 Tab Oral DAILY WITH BREAKFAST Martínez De Santiago MD   325 mg at 05/20/17 0853    sodium chloride (NS) flush 5-10 mL  5-10 mL IntraVENous Q8H Shanita Wylie MD   Stopped at 05/19/17 2214    sodium chloride (NS) flush 5-10 mL  5-10 mL IntraVENous PRN Shanita Wylie MD        0.9% sodium chloride infusion  75 mL/hr IntraVENous CONTINUOUS Shanita Wylie MD 75 mL/hr at 05/19/17 2214 75 mL/hr at 05/19/17 2214    rosuvastatin (CRESTOR) tablet 40 mg  40 mg Oral QHS Mindi Nelson MD   40 mg at 05/19/17 2214    cloNIDine HCl (CATAPRES) tablet 0.1 mg  0.1 mg Oral BID Mindi Nelson MD   0.1 mg at 05/20/17 0853    metoprolol tartrate (LOPRESSOR) tablet 12.5 mg  12.5 mg Oral BID Mindi Nelson MD   12.5 mg at 05/20/17 0853    hydrALAZINE (APRESOLINE) tablet 50 mg  50 mg Oral TID Bessie Porter MD   50 mg at 05/20/17 0854    amLODIPine (NORVASC) tablet 10 mg  10 mg Oral DAILY Bessie Porter MD   10 mg at 05/20/17 0854    allopurinol (ZYLOPRIM) tablet 150 mg  150 mg Oral DAILY Bonita Ryan III, MD   150 mg at 05/20/17 5462    cholecalciferol (VITAMIN D3) tablet 5,000 Units  5,000 Units Oral DAILY Bonita Ryan III, MD   5,000 Units at 93/49/13 0591    folic acid-vit O1-BKK K49 (FOLTX) 2.5-25-2 mg tablet 1 Tab  1 Tab Oral DAILY Bonita Ryan III, MD   1 Tab at 05/20/17 8930    dutasteride (AVODART) capsule 0.5 mg  0.5 mg Oral DAILY Bonita Ryan III, MD   0.5 mg at 05/20/17 6832    isosorbide mononitrate ER (IMDUR) tablet 30 mg  30 mg Oral DAILY Bonita Ryan III, MD   30 mg at 05/20/17 3459    levETIRAcetam (KEPPRA) tablet 1,500 mg  1,500 mg Oral BID Bonita Ryan III, MD   1,500 mg at 05/20/17 2586    levothyroxine (SYNTHROID) tablet 100 mcg  100 mcg Oral ACB Bonita Ryan III, MD   100 mcg at 05/20/17 0854    potassium chloride (KLOR-CON) tablet 10 mEq  10 mEq Oral BID Bonita Ryan III, MD   10 mEq at 05/20/17 5766    predniSONE (DELTASONE) tablet 5 mg  5 mg Oral DAILY Bonita Ryan III, MD   5 mg at 05/20/17 0909    bimatoprost (LUMIGAN) 0.01 % ophthalmic drops 1 Drop  1 Drop Ophthalmic QPM Bonita Ryan III, MD   1 Drop at 05/19/17 1955    magnesium hydroxide (MILK OF MAGNESIA) 400 mg/5 mL oral suspension 30 mL  30 mL Oral DAILY PRN Bonita Ryan III, MD        docusate sodium (COLACE) capsule 100 mg  100 mg Oral BID Bonita Ryan III, MD   100 mg at 05/20/17 0853    acetaminophen (TYLENOL) tablet 650 mg  650 mg Oral Q6H PRN Bonita Ryan III, MD        morphine injection 2 mg  2 mg IntraVENous Q4H PRN Bonita Ryan III, MD        nitroglycerin (NITROSTAT) tablet 0.4 mg  0.4 mg SubLINGual Q5MIN PRN Bonita Ryan III, MD        aspirin chewable tablet 81 mg  81 mg Oral DAILY Navi Almonte III, MD   81 mg at 05/20/17 0854    clopidogrel (PLAVIX) tablet 75 mg  75 mg Oral DAILY Navi Almonte III, MD   75 mg at 05/20/17 0854    ondansetron (ZOFRAN) injection 4 mg  4 mg IntraVENous Q4H PRN Navi Almonte III, MD        naloxone [facility]) injection 0.4 mg  0.4 mg IntraVENous PRN Navi Almonte III, MD        diphenhydrAMINE (BENADRYL) injection 12.5 mg  12.5 mg IntraVENous Q4H PRN Navi Almonte III, MD        insulin lispro (HUMALOG) injection   SubCUTAneous AC&HS Matthew Mojica MD   Stopped at 05/19/17 2214    glucose chewable tablet 16 g  16 g Oral PRN Matthew Mojica MD        glucagon (GLUCAGEN) injection 1 mg  1 mg IntraMUSCular PRN Matthew Mojica MD        dextrose (D50W) injection syrg 12.5-25 g  25-50 mL IntraVENous PRN Matthew Mojica MD        nitroglycerin (NITROBID) 2 % ointment 0.5 Inch  0.5 Inch Topical BID Andrew Watts MD   0.5 Inch at 05/20/17 0909        Physical Exam:     Physical Exam:   General:  Alert, cooperative, no distress, appears stated age. Lungs:   Clear to auscultation bilaterally. Heart:  Regular rate and rhythm, S1, S2 normal, no murmur, click, rub or gallop. Abdomen:   Soft, non-tender. Bowel sounds normal. No masses,  No organomegaly. Extremities: Extremities normal, atraumatic, no cyanosis .  Has edema   Skin: Skin color, texture, turgor normal. No rashes or lesions         Data Review:    CBC w/Diff    Recent Labs      05/19/17   1249  05/19/17   0341   WBC  10.4  7.8   RBC  3.14*  3.11*   HGB  8.3*  8.1*   HCT  25.3*  25.0*   MCV  80.6  80.4   MCH  26.4  26.0   MCHC  32.8  32.4   RDW  16.2*  16.0*    Recent Labs      05/19/17   1249  05/19/17   0341   MONOS   --   4   EOS   --   3   BASOS   --   0   RDW  16.2*  16.0*        Comprehensive Metabolic Profile    Recent Labs      05/19/17   0341  05/18/17   1015  05/18/17   0040   NA  143  146*  143   K  4.8  4.5  5.1   CL  114*  115*  114*   CO2  23  24  22   BUN  44* 46*  50*   CREA  2.09*  2.11*  2.17*    Recent Labs      05/19/17   0341  05/18/17   1015  05/18/17   0040   CA  8.5  8.6  8.3*                        Impression:       Active Hospital Problems    Diagnosis Date Noted    Heme positive stool 05/17/2017    Chronic kidney disease, stage III (moderate) 05/16/2017    Anemia 05/16/2017    Hypertension 05/16/2017    NSTEMI (non-ST elevated myocardial infarction) ([facility] [address] 05/13/2017      S/P cardiac Cath & stent.       Plan:     OK to DC home, advised to continue to have oral hydration, recheck  BMP in next 2 to 3 days with his PCP/ Nephrology  & then decide if Diuretic & ARB should be restarted or not, also advised to look for any Skin rash,or any livideo reticular pattern of skin changes  in 2 to 3 weeks & if does notify his Nephrologist.      Ne Gardner MD

## 2017-05-20 NOTE — PROGRESS NOTES
Received call from patient after discharge re: missing case for cpap. Called [employer] and spoke to [employer]. I was told the cpap bag was not in 352 or their lost and found. Spoke with Nettie on [facility] and was told the bag was missing last week. The SD nurse was informed by a \"family member\" the cpap case/bag was last seen in the family waiting room with other belongings to take home. I called Mr. Sushant Lynch back at [phone] and informed of my findings. He accepted this information with appreciation and informed me he would check with other families members and call back if he still was unable to find it.

## 2017-05-20 NOTE — ROUTINE PROCESS
1900:  Rec'd Elizabeth Rueda. @ 1900 from Jun Caldwell RN. SBAR reviewed with two-nurse check-offs done of meds, dressings, wounds, LDA's & revelant assessment findings. Overnight:  No complaints, moves self.  0100:  Noted 9-beat VT, self-limited. Mr. Eduin Chandler is asleep. When awakened, he is AAO, w,dr.  Back in SB with inverted T-waves. 0700:  No more ectopy. Up in chair with persistent encouragement. Verbal Patient-side shift change report given to E. Augustine Nageotte, (oncoming nurse) by Santana Ordoñez RN  (offgoing nurse). Report included the following information SBAR, Kardex, ED Summary, Procedure Summary, Intake/Output, MAR, Recent Results and Med Rec Status. Included:  Intro, hx, two-RN eval of relevant assessment findings, LDAs, skin, diagnostics and infusions.

## 2017-05-20 NOTE — PROGRESS NOTES
[facility]      CARDIOLOGY PROGRESS NOTE  RECS:    1. Acute non-ST elevation myocardial infarction - currently chest pain free. S/p PCI to LAD. Echo revealed preserved EF%. 2. Hypertension- controlled. 3. Hyperlipidemia- on statin. 4. Diabetes, treatment per medical team.  5. Severe obesity with sleep apnea on CPAP. 6. Renal insufficiency, - improved  7. Anemia-stable H&H    Patient s/p pci- no chest pain. Radial pulse intact. Can be discharged home- advised patient to follow up with her PCP and repeat labs. Meanwhile patient advised to continue adequate oral hydration. ASSESSMENT:  Hospital Problems  Date Reviewed: 5/17/2017          Codes Class Noted POA    * (Principal)Heme positive stool ICD-10-CM: R19.5  ICD-9-CM: 792.1  5/17/2017 Yes        Chronic kidney disease, stage III (moderate) ICD-10-CM: N18.3  ICD-9-CM: 585.3  5/16/2017 Unknown        Anemia ICD-10-CM: D64.9  ICD-9-CM: 285.9  5/16/2017 Unknown        Hypertension ICD-10-CM: I10  ICD-9-CM: 401.9  5/16/2017 Unknown        NSTEMI (non-ST elevated myocardial infarction) [facility]) ICD-10-CM: I21.4  ICD-9-CM: 410.70  5/13/2017 Unknown                SUBJECTIVE:    No CP or SOB  Feels good today. OBJECTIVE:    VS:   Visit Vitals    /53    Pulse (!) 58    Temp 98.2 °F (36.8 °C)    Resp 23    Ht 5' 7\" (1.702 m)    Wt 129.6 kg (285 lb 11.2 oz)    SpO2 99%    BMI 44.75 kg/m2         Intake/Output Summary (Last 24 hours) at 05/20/17 1110  Last data filed at 05/20/17 0800   Gross per 24 hour   Intake          3807.92 ml   Output             1450 ml   Net          2357.92 ml     TELE: normal sinus rhythm    General: in no apparent distress  HENT: Normocephalic, atraumatic. Normal external eye.   Neck :  JVD difficult to assess due to obesity  Cardiac:  regular rate and rhythm, S1, S2 normal, no murmur, click, rub or gallop  Lungs: clear to auscultation bilaterally  Abdomen: Soft, distended  , no masses  Extremities:  Mild 1+edema ble     Labs: Results:       Chemistry Recent Labs      05/19/17   0341  05/18/17   1015  05/18/17   0040   GLU  90  114*  144*   NA  143  146*  143   K  4.8  4.5  5.1   CL  114*  115*  114*   CO2  23  24  22   BUN  44*  46*  50*   CREA  2.09*  2.11*  2.17*   CA  8.5  8.6  8.3*   AGAP  6  7  7   BUCR  21*  22*  23*      CBC w/Diff Recent Labs      05/19/17   1249  05/19/17   0341   WBC  10.4  7.8   RBC  3.14*  3.11*   HGB  8.3*  8.1*   HCT  25.3*  25.0*   PLT  177  185   GRANS   --   75*   LYMPH   --   18*   EOS   --   3      Cardiac Enzymes No results for input(s): CPK, CKND1, AHSAN in the last 72 hours. No lab exists for component: CKRMB, TROIP   Coagulation Recent Labs      05/19/17   0633  05/19/17   0300   APTT  50.1*  45.1*       Lipid Panel Lab Results   Component Value Date/Time    Cholesterol, total 203 05/15/2017 05:05 AM    HDL Cholesterol 62 05/15/2017 05:05 AM    LDL, calculated 121.8 05/15/2017 05:05 AM    VLDL, calculated 19.2 05/15/2017 05:05 AM    Triglyceride 96 05/15/2017 05:05 AM    CHOL/HDL Ratio 3.3 05/15/2017 05:05 AM      BNP No results for input(s): BNPP in the last 72 hours. Liver Enzymes No results for input(s): TP, ALB, TBIL, AP, SGOT, GPT in the last 72 hours.     No lab exists for component: DBIL   Thyroid Studies Lab Results   Component Value Date/Time    TSH 0.38 05/14/2017 07:51 AM              Reyna Petersen MD

## 2017-05-20 NOTE — DISCHARGE SUMMARY
Discharge Summary     Patient ID:  Chetan Jorgensen  [id number]  37 y.o.  1945  Body mass index is 44.75 kg/(m^2). PCP on record: PROVIDER UNKNOWN    Admit date: 2017  Discharge date and time: 2017    Discharge Diagnoses:                                           NSTEMI - S/p Cath and Stent (see detail Cath report Below ) - on Plavix and ASA , Follow Up with his cardiologist in [facility] , patient has no chest pains now , Off Heparin Drip   CAD - As above   DM2 - Continue home meds listed below , Weight loss and Diabetic diet D/W patient   Morbid Obesity   HTN - Add BB and Increase dose of Norvasc   HLD - Change Lipitor to Crestor   MIGNON - Continue CPAP /patient has it   Seizure disorder - Continue Current meds   Acute on chronic CKD3 - Mild improvement noted after initial admission time but still significant renal dysfunction , renal was consulted and IV hydration dose , follow up labs with his PCP /Cardiology - D/W patient       Consults: Cardiology          Hospital Course by problems:          Patient seen and examined by me on discharge day. Pertinent Findings:      Significant Diagnostic Studies:  [address] [place] [address] CATH LAB     Name: Amando Ledbetter  MR#: [id number]  : 1945  Account #: [de-identified]  Date of Adm: 2017  Date of Service: 2017        ESTIMATED BLOOD LOSS:      SPECIMENS REMOVED:      PROCEDURE PERFORMED BY: Rhiannon Zapata MD     NAME OF PROCEDURE: Coronary angiogram. Status post  percutaneous transluminal coronary angioplasty/stent to mid left  anterior descending artery.     INDICATION: Non-ST elevation myocardial infarction.     ENTRY: Right radial artery.     COMPLICATIONS: None.     SEDATION: Moderate sedation was performed using 0.5 mg of Versed  and 25 mcg of fentanyl.  Total sedation time was 60 minutes.     PROCEDURE IN DETAIL: The patient was earlier seen in Cardiology  consult for non-ST elevation myocardial infarction. Subsequent stress  tests revealed high risk scan. Following this, a written witnessed  informed consent was obtained from the patient. The patient has  known coronary artery disease with a prior PCI to LAD. The patient  was prepped and draped in a sterile fashion. Lidocaine 1% was  injected around the right radial artery and the right radial artery was  accessed using 6-[place] arterial sheath without any complication. Verapamil 2.5 mg and 200 mcg of nitroglycerin were administered  intraarterial to relieve vasospasm. Following this, a Johnson catheter was  advanced over a wire. We noted a tortuous course of the right  brachiocephalic artery. With careful manipulation, we were able to get  to the aortic root. Following this, the wire was removed and a left  coronary angiogram was performed using a Johnson catheter. We noted  a left-to-right collateral and known right coronary artery occlusion. Hence, a right coronary angiogram was not done. The patient also had  significant renal disease. Hence, we decided not to perform right  coronary angiogram. Following diagnostic imaging, we noted the  patient to have high-grade stenosis of mid LAD. Due to non-ST  elevation myocardial infarction and chest pain, we decided to proceed  with intervention. At this time, we exchanged an XBLAD 3.5 guide over  a long exchange wire. The catheter was advanced to the level of left  main artery. A repeat diagnostic angiogram was performed which  confirmed the stenosis. Following this, we then advanced a  Runthrough wire to the level of mid LAD. ACT of greater than 200 was  achieved using IV heparin. Following this, PCA of the mid LAD with  overlap of the preexisting stent was performed using a Xience 3.0 mm  x 15 mm stent deployed about 12 atmospheres. Post-PCA PTCA was  performed using a Trek noncompliant 3.5 mm x 12 mm balloon inflated  about 18 atmospheres. The stent was adequately deployed.  We also  noted moderate in-stent stenosis in the mid LAD. Hence, we performed  PTCA using a noncompliant 3.5 mm x 12 mm balloon inflated about 18  atmospheres. Post-stent angiogram revealed excellent stent apposition  with good resolution of stenosis from 95% to 10%. Following this,  supporting catheter and wire were removed. A Rad-Band was applied  to right radial artery to achieve hemostasis.     FINDINGS  1. Left main is patent. It bifurcates into left anterior descending artery  and circumflex artery. 2. Left anterior descending artery has wall irregularities in its proximal  portion followed by a stent noted in the proximal to midportion. A 50%  in-stent stenosis was noted in the midportion of the stent. Distal to the  stent, we noted a 95% eccentric stenosis involving the mid LAD. Mid to  distal LAD also had a sequential 60% stenosis at the bifurcation of  diagonal 2 artery. Status post PCI using a Xience 3.0 mm x 15 mm  stent deployed about 12 atmospheres. Post-PCA PTCA was  performed using a Trek 3.5 mm x 12 mm balloon inflated about 18  atmospheres. We also performed PTCA to the midportion of the stent  which had a 50% in-stent stenosis using the same noncompliant  balloon. The lesion was reduced to about 20%. The lesion in the mid to  distal segment was reduced from 95% to 10% after the stent  deployment. We noted VAHE-3 flow with excellent angiographic result. Mid to distal left anterior descending artery had a residual 60%, which  remained stable. 3. Circumflex artery appears to be codominant and is a medium to  large caliber vessel with wall irregularities. It has obtuse marginal 1  and obtuse marginal 2 arteries which are medium caliber vessels with  wall irregularities. We noted brisk left-to-right collaterals extending to  the mid right coronary artery.     CONCLUSION: Double vessel coronary artery disease. Known right  coronary artery occlusion.  Moderate in-stent stenosis of mid left  anterior descending followed by 95% stenosis. Status post  percutaneous transluminal coronary angioplasty/stent using a drug-  eluting stent.     RECOMMENDATIONS: The patient will continue aspirin and Plavix at  this time. We will continue post-procedure hydration for an additional  12 hours.           MD ROOSEVELT Castillo / Derik Lee  D: 05/19/2017 15:54  T: 05/19/2017 21:37  Job #: [id number]    Pertinent Lab Data:  Recent Labs      05/19/17   1249  05/19/17   0341   WBC  10.4  7.8   HGB  8.3*  8.1*   HCT  25.3*  25.0*   PLT  177  185     Recent Labs      05/19/17   0341  05/18/17   1015  05/18/17   0040   NA  143  146*  143   K  4.8  4.5  5.1   CL  114*  115*  114*   CO2  23  24  22   GLU  90  114*  144*   BUN  44*  46*  50*   CREA  2.09*  2.11*  2.17*   CA  8.5  8.6  8.3*       DISCHARGE MEDICATIONS:   @  Current Discharge Medication List      START taking these medications    Details   clopidogrel (PLAVIX) 75 mg tab Take 1 Tab by mouth daily. Qty: 30 Tab, Refills: 0      metoprolol tartrate (LOPRESSOR) 25 mg tablet Take 0.5 Tabs by mouth two (2) times a day. Qty: 60 Tab, Refills: 0      rosuvastatin (CRESTOR) 40 mg tablet Take 1 Tab by mouth nightly. Qty: 30 Tab, Refills: 0         CONTINUE these medications which have CHANGED    Details   amLODIPine (NORVASC) 5 mg tablet Take 2 Tabs by mouth daily. Qty: 20 Tab, Refills: 0    Comments: Increased dose         CONTINUE these medications which have NOT CHANGED    Details   aspirin 81 mg chewable tablet Take 81 mg by mouth daily. TRAVOPROST, BENZALKONIUM, (TRAVATAN OP) Apply  to eye. CYANOCOBALAMIN/FOLIC AC/VIT B6 (FOLBIC PO) Take  by mouth. cholecalciferol, VITAMIN D3, (VITAMIN D3) 5,000 unit tab tablet Take 5,000 Units by mouth daily. hydrALAZINE (APRESOLINE) 50 mg tablet Take 50 mg by mouth two (2) times a day. losartan (COZAAR) 50 mg tablet Take 50 mg by mouth daily. dutasteride (AVODART) 0.5 mg capsule Take 0.5 mg by mouth daily.       bumetanide (BUMEX) 1 mg tablet Take 1 mg by mouth two (2) times a day. LEVETIRACETAM (KEPPRA PO) Take 1,500 mg by mouth two (2) times a day. predniSONE (DELTASONE) 5 mg tablet Take 5 mg by mouth daily. 2.5 mg in am      isosorbide mononitrate ER (IMDUR) 30 mg tablet Take 30 mg by mouth daily. levothyroxine (SYNTHROID) 100 mcg tablet Take 100 mcg by mouth Daily (before breakfast). glimepiride (AMARYL) 2 mg tablet Take 2 mg by mouth every morning. folic acid-vit T0-XHU L93 (VIRT-NABIL) 2.5-25-1 mg tablet Take 1 Tab by mouth daily. ALLOPURINOL PO Take 150 mg by mouth daily. STOP taking these medications       potassium chloride SR (KLOR-CON 10) 10 mEq tablet Comments:   Reason for Stopping:         atorvastatin (LIPITOR) 40 mg tablet Comments:   Reason for Stopping:                 My Recommended Diet, Activity, Wound Care, and follow-up labs are listed in the patient's Discharge Insturctions which I have personally completed and reviewed. Disposition:     [x] Home with family     [] [facility] PT/RN   [] SNF/NH   [] Inpatient Rehab/BUCK  Condition at Discharge:  Stable    Follow up with:   PCP : PROVIDER UNKNOWN      Please follow-up tests/labs that are still pendin.  None  2.    >30 minutes spent coordinating this discharge (review instructions/follow-up, prescriptions, preparing report for sign off)    Signed:  Gissel Cross MD  2017  10:44 AM

## 2017-05-20 NOTE — DISCHARGE INSTRUCTIONS
DISCHARGE SUMMARY from Nurse    The following personal items are in your possession at time of discharge:    Dental Appliances: None  Visual Aid: None     Home Medications: Sent home  Jewelry: Ring  Clothing: At bedside  Other Valuables: None             PATIENT INSTRUCTIONS:    After general anesthesia or intravenous sedation, for 24 hours or while taking prescription Narcotics:  · Limit your activities  · Do not drive and operate hazardous machinery  · Do not make important personal or business decisions  · Do  not drink alcoholic beverages  · If you have not urinated within 8 hours after discharge, please contact your surgeon on call. Report the following to your cardiologist  · Excessive pain, swelling, redness or odor of or around the surgical area  · Temperature over 101  · Nausea and vomiting lasting longer than 4 hours or if unable to take medications  · Any signs of decreased circulation or nerve impairment to extremity: change in color, persistent  numbness, tingling, coldness or increase pain  · Any questions    Cardiac Catheterization/Angiography Discharge Instructions    *Check the puncture site frequently for swelling or bleeding. If you see any bleeding, lie down and apply pressure over the area with a clean town or washcloth. Notify your doctor for any redness, swelling, drainage or oozing from the puncture site. Notify your doctor for any fever or chills. *If the leg or arm with the puncture becomes cold, numb or painful, call Cardiologist     *Activity should be limited for the next 48 hours. Climb stairs as little as possible and avoid any stooping, bending or strenuous activity for 48 hours. No heavy lifting (anything over 10 pounds) for three days. *Do not drive for 48 hours. *You may resume your usual diet. Drink more fluids than usual.    *Have a responsible person drive you home and stay with you for at least 24 hours after your heart catheterization/angiography.     *You may remove the bandage from your Right and Arm in 24 hours. You may shower in 24 hours. No tub baths, hot tubs or swimming for one week. Do not place any lotions, creams, powders, ointments over the puncture site for one week. You may place a clean band-aid over the puncture site each day for 5 days. Change this daily. What to do at Home:  Recommended activity: Activity as tolerated and no driving for today and No heavy lifting, pushing, until follow up with cardiologist    If you experience any of the following symptoms fever chills numbness tingling to affected site , please follow up with cardiologist.      *  Please give a list of your current medications to your Primary Care Provider. *  Please update this list whenever your medications are discontinued, doses are      changed, or new medications (including over-the-counter products) are added. *  Please carry medication information at all times in case of emergency situations. These are general instructions for a healthy lifestyle:    No smoking/ No tobacco products/ Avoid exposure to second hand smoke    Surgeon General's Warning:  Quitting smoking now greatly reduces serious risk to your health. Obesity, smoking, and sedentary lifestyle greatly increases your risk for illness    A healthy diet, regular physical exercise & weight monitoring are important for maintaining a healthy lifestyle    You may be retaining fluid if you have a history of heart failure or if you experience any of the following symptoms:  Weight gain of 3 pounds or more overnight or 5 pounds in a week, increased swelling in our hands or feet or shortness of breath while lying flat in bed. Please call your doctor as soon as you notice any of these symptoms; do not wait until your next office visit.     Recognize signs and symptoms of STROKE:    F-face looks uneven    A-arms unable to move or move unevenly    S-speech slurred or non-existent    T-time-call 911 as soon as signs and symptoms begin-DO NOT go       Back to bed or wait to see if you get better-TIME IS BRAIN. Warning Signs of HEART ATTACK     Call 911 if you have these symptoms:   Chest discomfort. Most heart attacks involve discomfort in the center of the chest that lasts more than a few minutes, or that goes away and comes back. It can feel like uncomfortable pressure, squeezing, fullness, or pain.  Discomfort in other areas of the upper body. Symptoms can include pain or discomfort in one or both arms, the back, neck, jaw, or stomach.  Shortness of breath with or without chest discomfort.  Other signs may include breaking out in a cold sweat, nausea, or lightheadedness. Don't wait more than five minutes to call 911 - MINUTES MATTER! Fast action can save your life. Calling 911 is almost always the fastest way to get lifesaving treatment. Emergency Medical Services staff can begin treatment when they arrive -- up to an hour sooner than if someone gets to the hospital by car. The discharge information has been reviewed with the patient and spouse. The patient and spouse verbalized understanding. Discharge medications reviewed with the patient and spouse and appropriate educational materials and side effects teaching were provided.   Patient armband removed and shredded

## 2017-05-20 NOTE — PROGRESS NOTES
Received report on pt.from off going RN. Resting quietly in bed on rounds. Heparin infusing via guardrail. Denies c/o pain or SOB at this time. NPO for cardiac cath today. No acute distress noted. Will cont to monitor for any changes in status. 1030 family at bedside. 1100 report given to cardiac cath nurse. Informed that pr declined being prepped in groin area by night RN and myself. Prefers to have a man do it. Sts they will take care of it in cath holding. 1300 received call that pt will be transferred to CVT. No personal belongings found in room to be transferred over.

## 2017-05-20 NOTE — PROCEDURES
[facility] CATH LAB    Name:  Jen Christie  MR#:  [id number]  :  1945  Account #:  [de-identified]  Date of Adm:  2017  Date of Service:  2017      ESTIMATED BLOOD LOSS: none    SPECIMENS REMOVED: none    PROCEDURE PERFORMED BY: Antonio Sims MD    NAME OF PROCEDURE: Coronary angiogram. Status post  percutaneous transluminal coronary angioplasty/stent to mid left  anterior descending artery. INDICATION: Non-ST elevation myocardial infarction. ENTRY: Right radial artery. COMPLICATIONS: None. SEDATION: Moderate sedation was performed using 0.5 mg of Versed  and 25 mcg of fentanyl. Total sedation time was 60 minutes. PROCEDURE IN DETAIL: The patient was earlier seen in Cardiology  consult for non-ST elevation myocardial infarction. Subsequent stress  tests revealed high risk scan. Following this, a written witnessed  informed consent was obtained from the patient. The patient has  known coronary artery disease with a prior PCI to LAD. The patient  was prepped and draped in a sterile fashion. Lidocaine 1% was  injected around the right radial artery and the right radial artery was  accessed using 6-[place] arterial sheath without any complication. Verapamil 2.5 mg and 200 mcg of nitroglycerin were administered  intraarterial to relieve vasospasm. Following this, a Johnson catheter was  advanced over a wire. We noted a tortuous course of the right  brachiocephalic artery. With careful manipulation, we were able to get  to the aortic root. Following this, the wire was removed and a left  coronary angiogram was performed using a Johnson catheter. We noted  a left-to-right collateral and known right coronary artery occlusion. Hence, a right coronary angiogram was not done. The patient also had  significant renal disease.  Hence, we decided not to perform right  coronary angiogram. Following diagnostic imaging, we noted the  patient to have high-grade stenosis of mid LAD. Due to non-ST  elevation myocardial infarction and chest pain, we decided to proceed  with intervention. At this time, we exchanged an XBLAD 3.5 guide over  a long exchange wire. The catheter was advanced to the level of left  main artery. A repeat diagnostic angiogram was performed which  confirmed the stenosis. Following this, we then advanced a  Runthrough wire to the level of mid LAD. ACT of greater than 200 was  achieved using IV heparin. Following this, PCA of the mid LAD with  overlap of the preexisting stent was performed using a Xience 3.0 mm  x 15 mm stent deployed about 12 atmospheres. Post-PCA PTCA was  performed using a Trek noncompliant 3.5 mm x 12 mm balloon inflated  about 18 atmospheres. The stent was adequately deployed. We also  noted moderate in-stent stenosis in the mid LAD. Hence, we performed  PTCA using a noncompliant 3.5 mm x 12 mm balloon inflated about 18  atmospheres. Post-stent angiogram revealed excellent stent apposition  with good resolution of stenosis from 95% to 10%. Following this,  supporting catheter and wire were removed. A Rad-Band was applied  to right radial artery to achieve hemostasis. FINDINGS  1. Left main is patent. It bifurcates into left anterior descending artery  and circumflex artery. 2. Left anterior descending artery has wall irregularities in its proximal  portion followed by a stent noted in the proximal to midportion. A 50%  in-stent stenosis was noted in the midportion of the stent. Distal to the  stent, we noted a 95% eccentric stenosis involving the mid LAD. Mid to  distal LAD also had a sequential 60% stenosis at the bifurcation of  diagonal 2 artery. Status post PCI using a Xience 3.0 mm x 15 mm  stent deployed about 12 atmospheres. Post-PCA PTCA was  performed using a Trek 3.5 mm x 12 mm balloon inflated about 18  atmospheres.  We also performed PTCA to the midportion of the stent  which had a 50% in-stent stenosis using the same noncompliant  balloon. The lesion was reduced to about 20%. The lesion in the mid to  distal segment was reduced from 95% to 10% after the stent  deployment. We noted VAHE-3 flow with excellent angiographic result. Mid to distal left anterior descending artery had a residual 60%, which  remained stable. 3. Circumflex artery appears to be codominant and is a medium to  large caliber vessel with wall irregularities. It has obtuse marginal 1  and obtuse marginal 2 arteries which are medium caliber vessels with  wall irregularities. We noted brisk left-to-right collaterals extending to  the mid right coronary artery. CONCLUSION: Double vessel coronary artery disease. Known right  coronary artery occlusion. Moderate in-stent stenosis of mid left  anterior descending followed by mid 95% stenosis. Status post  percutaneous transluminal coronary angioplasty/stent using a drug-  eluting stent. RECOMMENDATIONS: The patient will continue aspirin and Plavix at  this time. We will continue post-procedure hydration for an additional  12 hours.         MD Ramone Sharma  D:  05/19/2017   15:54  T:  05/19/2017   21:37  Job #:  [id number]

## 2017-05-22 ENCOUNTER — TELEPHONE (OUTPATIENT)
Dept: CARDIAC REHAB | Age: 72
End: 2017-05-22

## 2017-05-22 ENCOUNTER — NURSE NAVIGATOR (OUTPATIENT)
Dept: OTHER | Age: 72
End: 2017-05-22

## 2017-05-22 NOTE — TELEPHONE ENCOUNTER
Cardiac Rehab called patient and spoke with him about the benefits of cardiac rehab programs. He said he will look into similar programs where he lives in [facility].     Thank you,  Tang Monique
